# Patient Record
Sex: MALE | Race: WHITE | NOT HISPANIC OR LATINO | Employment: OTHER | URBAN - METROPOLITAN AREA
[De-identification: names, ages, dates, MRNs, and addresses within clinical notes are randomized per-mention and may not be internally consistent; named-entity substitution may affect disease eponyms.]

---

## 2017-01-12 ENCOUNTER — TELEPHONE (OUTPATIENT)
Dept: GASTROENTEROLOGY | Facility: CLINIC | Age: 77
End: 2017-01-12

## 2017-01-12 NOTE — TELEPHONE ENCOUNTER
Patient called. Advised of Dr. Swenson's note, patient states he feels ok and would like to see Dr. Swenson towards the end of February or first of March.  Appt scheduled for 03/02/17@1689 with Dr. Swenson.  Mirta coon.

## 2017-01-12 NOTE — TELEPHONE ENCOUNTER
----- Message from Fei Swenson MD sent at 1/12/2017 10:53 AM EST -----  Regarding: RE: DARIUS  Please call patient he can come see me as early as next week overbook or if hes feeling well, OV 3-6 mos    thanks    bd        ----- Message -----     From: Fei Wilkinson MD     Sent: 1/9/2017   4:56 PM       To: Fei Swenson MD  Subject: DARIUS MCCOY - this gentleman was put on my clinic schedule today by Rizwana, I believe by accident.  I spoke with him briefly - he is currently asymptomatic.  I told him I would let you know he was doing well currently and that you would decide if he needed to see Dr. Edwards to discuss repeat ERCP in the future.  Thanks.

## 2017-03-02 ENCOUNTER — OFFICE VISIT (OUTPATIENT)
Dept: GASTROENTEROLOGY | Facility: CLINIC | Age: 77
End: 2017-03-02

## 2017-03-02 VITALS
BODY MASS INDEX: 33.26 KG/M2 | SYSTOLIC BLOOD PRESSURE: 124 MMHG | WEIGHT: 245.6 LBS | HEIGHT: 72 IN | DIASTOLIC BLOOD PRESSURE: 72 MMHG

## 2017-03-02 DIAGNOSIS — R10.13 EPIGASTRIC PAIN: ICD-10-CM

## 2017-03-02 DIAGNOSIS — R14.0 ABDOMINAL BLOATING: Primary | ICD-10-CM

## 2017-03-02 PROCEDURE — 99213 OFFICE O/P EST LOW 20 MIN: CPT | Performed by: INTERNAL MEDICINE

## 2017-03-02 RX ORDER — AZITHROMYCIN 500 MG/1
500 TABLET, FILM COATED ORAL DAILY
Qty: 6 TABLET | Refills: 1 | Status: ON HOLD | OUTPATIENT
Start: 2017-03-02 | End: 2019-10-29

## 2017-03-02 NOTE — PROGRESS NOTES
Chief Complaint   Patient presents with   • Follow-up     from tests        Memo Bishop is a  77 y.o. male here for a follow up visit for epigastric pain, which has now resolved    HPI Comments: 76-year-old male patient of Dr. Swenson's who was last evaluated in the office on October 6. At that time he presented with complaints of epigastric pain, indigestion and intermittent nausea of greater than 6 months duration. 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 Kamryn Blank. Patient reported complete relief of his symptoms until the recurrence now 7-8 months ago. His workup to date has consisted of labs with normal LFTs and an EGD performed in October with negative findings. He was placed on Levsin which has been of huge benefit. An ultrasound was done with CBD size 8mm    He has not had pain now in 3 months.  No nausea, vomiting.  No change in bowel habits.      Past Medical History   Diagnosis Date   • Abnormal LFTs    • Cancer 2007     prostate cancer   • GERD (gastroesophageal reflux disease)    • Hyperlipidemia    • Hypertension        Past Surgical History   Procedure Laterality Date   • Ercp  2010     @ [facility]   • Pancreas surgery     • Sphincterotomy     • Endoscopy N/A 10/14/2016     Procedure: ESOPHAGOGASTRODUODENOSCOPY with biopsy;  Surgeon: Fei Swenson MD;  Location: [facility] ENDOSCOPY;  Service:    • Colonoscopy  2014     @[facility] per pt       Scheduled Meds:    Continuous Infusions:  No current facility-administered medications for this visit.     PRN Meds:.    Allergies   Allergen Reactions   • Levaquin [Levofloxacin]        Social History     Social History   • Marital status:      Spouse name: N/A   • Number of children: N/A   • Years of education: N/A     Occupational History   • Not on file.     Social History Main Topics   • Smoking status: Former Smoker     Packs/day: 1.00      Types: Cigarettes     Quit date: 1972   • Smokeless tobacco: Never Used   • Alcohol use Yes      Comment: social   • Drug use: No   • Sexual activity: Not on file     Other Topics Concern   • Not on file     Social History Narrative       Family History   Problem Relation Age of Onset   • Esophageal cancer Brother    • GIULIA disease Brother    • Prostate cancer Father    • GIULIA disease Father    • GIULIA disease Brother    • GIULIA disease Brother    • GIULIA disease Brother        Review of Systems   Constitutional: Negative.    All other systems reviewed and are negative.      Vitals:    03/02/17 1526   BP: 124/72       Physical Exam   Constitutional: He is oriented to person, place, and time. He appears well-developed and well-nourished.   HENT:   Head: Normocephalic and atraumatic.   Eyes: EOM are normal. Pupils are equal, round, and reactive to light.   Cardiovascular: Normal rate, regular rhythm and normal heart sounds.    Pulmonary/Chest: Effort normal.   Abdominal: Soft. Bowel sounds are normal. He exhibits no distension and no mass. There is no tenderness. No hernia.   Musculoskeletal: Normal range of motion.   Neurological: He is alert and oriented to person, place, and time.   Skin: Skin is dry.   Psychiatric: He has a normal mood and affect. His behavior is normal. Judgment and thought content normal.       No images are attached to the encounter.  Problem list    Epigastric pain, intermittent, resolved  Suspected sphincter of OD dysfunction, intermittent, resolved  Recent EGD normal  Recent ultrasound normal  Routine labs normal      Assessment/Plan    Continue Levsin as needed for pain of the upper abdomen    PPI daily for heartburn    Description for Zithromax given as he plans to travel to [place] and occasionally gets traveler's diarrhea.  He tells me Zithromax works where Cipro does not

## 2018-05-10 ENCOUNTER — PREP FOR SURGERY (OUTPATIENT)
Dept: OTHER | Facility: HOSPITAL | Age: 78
End: 2018-05-10

## 2018-05-10 DIAGNOSIS — Z12.11 ENCOUNTER FOR COLORECTAL CANCER SCREENING: Primary | ICD-10-CM

## 2018-05-10 DIAGNOSIS — Z12.12 ENCOUNTER FOR COLORECTAL CANCER SCREENING: Primary | ICD-10-CM

## 2018-05-22 PROBLEM — Z12.11 ENCOUNTER FOR COLORECTAL CANCER SCREENING: Status: ACTIVE | Noted: 2018-05-22

## 2018-05-22 PROBLEM — Z12.12 ENCOUNTER FOR COLORECTAL CANCER SCREENING: Status: ACTIVE | Noted: 2018-05-22

## 2018-06-07 ENCOUNTER — ANESTHESIA EVENT (OUTPATIENT)
Dept: GASTROENTEROLOGY | Facility: HOSPITAL | Age: 78
End: 2018-06-07

## 2018-06-07 ENCOUNTER — ANESTHESIA (OUTPATIENT)
Dept: GASTROENTEROLOGY | Facility: HOSPITAL | Age: 78
End: 2018-06-07

## 2018-06-07 ENCOUNTER — HOSPITAL ENCOUNTER (OUTPATIENT)
Facility: HOSPITAL | Age: 78
Setting detail: HOSPITAL OUTPATIENT SURGERY
Discharge: HOME | End: 2018-06-07
Attending: INTERNAL MEDICINE | Admitting: INTERNAL MEDICINE

## 2018-06-07 VITALS
TEMPERATURE: 97.9 F | HEIGHT: 70 IN | SYSTOLIC BLOOD PRESSURE: 119 MMHG | WEIGHT: 240 LBS | DIASTOLIC BLOOD PRESSURE: 64 MMHG | OXYGEN SATURATION: 98 % | BODY MASS INDEX: 34.36 KG/M2 | RESPIRATION RATE: 18 BRPM | HEART RATE: 57 BPM

## 2018-06-07 DIAGNOSIS — Z12.12 ENCOUNTER FOR COLORECTAL CANCER SCREENING: Primary | ICD-10-CM

## 2018-06-07 DIAGNOSIS — D50.8 OTHER IRON DEFICIENCY ANEMIA: ICD-10-CM

## 2018-06-07 DIAGNOSIS — Z12.11 ENCOUNTER FOR COLORECTAL CANCER SCREENING: Primary | ICD-10-CM

## 2018-06-07 DIAGNOSIS — E64.0 SEQUELAE OF PROTEIN-CALORIE MALNUTRITION (HCC): ICD-10-CM

## 2018-06-07 LAB
BASOPHILS # BLD AUTO: 0.01 10*3/MM3 (ref 0–0.2)
BASOPHILS NFR BLD AUTO: 0.1 % (ref 0–1.5)
DEPRECATED RDW RBC AUTO: 46.2 FL (ref 37–54)
EOSINOPHIL # BLD AUTO: 0.22 10*3/MM3 (ref 0–0.7)
EOSINOPHIL NFR BLD AUTO: 2.2 % (ref 0.3–6.2)
ERYTHROCYTE [DISTWIDTH] IN BLOOD BY AUTOMATED COUNT: 13.4 % (ref 11.5–14.5)
FERRITIN SERPL-MCNC: 247.8 NG/ML (ref 30–400)
FOLATE SERPL-MCNC: 10.09 NG/ML (ref 4.78–24.2)
HCT VFR BLD AUTO: 37.2 % (ref 40.4–52.2)
HGB BLD-MCNC: 11.6 G/DL (ref 13.7–17.6)
IMM GRANULOCYTES # BLD: 0.03 10*3/MM3 (ref 0–0.03)
IMM GRANULOCYTES NFR BLD: 0.3 % (ref 0–0.5)
IRON 24H UR-MRATE: 99 MCG/DL (ref 59–158)
IRON SATN MFR SERPL: 38 % (ref 20–50)
LYMPHOCYTES # BLD AUTO: 1.93 10*3/MM3 (ref 0.9–4.8)
LYMPHOCYTES NFR BLD AUTO: 19.3 % (ref 19.6–45.3)
MCH RBC QN AUTO: 29.3 PG (ref 27–32.7)
MCHC RBC AUTO-ENTMCNC: 31.2 G/DL (ref 32.6–36.4)
MCV RBC AUTO: 93.9 FL (ref 79.8–96.2)
MONOCYTES # BLD AUTO: 1.11 10*3/MM3 (ref 0.2–1.2)
MONOCYTES NFR BLD AUTO: 11.1 % (ref 5–12)
NEUTROPHILS # BLD AUTO: 6.71 10*3/MM3 (ref 1.9–8.1)
NEUTROPHILS NFR BLD AUTO: 67 % (ref 42.7–76)
PLATELET # BLD AUTO: 178 10*3/MM3 (ref 140–500)
PMV BLD AUTO: 10.2 FL (ref 6–12)
RBC # BLD AUTO: 3.96 10*6/MM3 (ref 4.6–6)
TIBC SERPL-MCNC: 258 MCG/DL (ref 298–536)
TRANSFERRIN SERPL-MCNC: 173 MG/DL (ref 200–360)
VIT B12 BLD-MCNC: 496 PG/ML (ref 211–946)
WBC NRBC COR # BLD: 10.01 10*3/MM3 (ref 4.5–10.7)

## 2018-06-07 PROCEDURE — 82728 ASSAY OF FERRITIN: CPT | Performed by: INTERNAL MEDICINE

## 2018-06-07 PROCEDURE — 85025 COMPLETE CBC W/AUTO DIFF WBC: CPT | Performed by: INTERNAL MEDICINE

## 2018-06-07 PROCEDURE — 84466 ASSAY OF TRANSFERRIN: CPT | Performed by: INTERNAL MEDICINE

## 2018-06-07 PROCEDURE — S0260 H&P FOR SURGERY: HCPCS | Performed by: INTERNAL MEDICINE

## 2018-06-07 PROCEDURE — G0121 COLON CA SCRN NOT HI RSK IND: HCPCS | Performed by: INTERNAL MEDICINE

## 2018-06-07 PROCEDURE — 82746 ASSAY OF FOLIC ACID SERUM: CPT | Performed by: INTERNAL MEDICINE

## 2018-06-07 PROCEDURE — 25010000002 PROPOFOL 10 MG/ML EMULSION: Performed by: ANESTHESIOLOGY

## 2018-06-07 PROCEDURE — 82607 VITAMIN B-12: CPT | Performed by: INTERNAL MEDICINE

## 2018-06-07 PROCEDURE — 83540 ASSAY OF IRON: CPT | Performed by: INTERNAL MEDICINE

## 2018-06-07 PROCEDURE — 36415 COLL VENOUS BLD VENIPUNCTURE: CPT | Performed by: INTERNAL MEDICINE

## 2018-06-07 RX ORDER — SODIUM CHLORIDE, SODIUM LACTATE, POTASSIUM CHLORIDE, CALCIUM CHLORIDE 600; 310; 30; 20 MG/100ML; MG/100ML; MG/100ML; MG/100ML
1000 INJECTION, SOLUTION INTRAVENOUS CONTINUOUS
Status: DISCONTINUED | OUTPATIENT
Start: 2018-06-07 | End: 2018-06-07 | Stop reason: HOSPADM

## 2018-06-07 RX ORDER — SODIUM CHLORIDE 0.9 % (FLUSH) 0.9 %
3 SYRINGE (ML) INJECTION AS NEEDED
Status: DISCONTINUED | OUTPATIENT
Start: 2018-06-07 | End: 2018-06-07 | Stop reason: HOSPADM

## 2018-06-07 RX ORDER — LIDOCAINE HYDROCHLORIDE 10 MG/ML
0.5 INJECTION, SOLUTION INFILTRATION; PERINEURAL ONCE AS NEEDED
Status: DISCONTINUED | OUTPATIENT
Start: 2018-06-07 | End: 2018-06-07 | Stop reason: HOSPADM

## 2018-06-07 RX ORDER — LIDOCAINE HYDROCHLORIDE 20 MG/ML
INJECTION, SOLUTION INFILTRATION; PERINEURAL AS NEEDED
Status: DISCONTINUED | OUTPATIENT
Start: 2018-06-07 | End: 2018-06-07 | Stop reason: SURG

## 2018-06-07 RX ORDER — PROPOFOL 10 MG/ML
VIAL (ML) INTRAVENOUS CONTINUOUS PRN
Status: DISCONTINUED | OUTPATIENT
Start: 2018-06-07 | End: 2018-06-07 | Stop reason: SURG

## 2018-06-07 RX ORDER — PROPOFOL 10 MG/ML
VIAL (ML) INTRAVENOUS AS NEEDED
Status: DISCONTINUED | OUTPATIENT
Start: 2018-06-07 | End: 2018-06-07 | Stop reason: SURG

## 2018-06-07 RX ADMIN — PROPOFOL 100 MG: 10 INJECTION, EMULSION INTRAVENOUS at 11:20

## 2018-06-07 RX ADMIN — SODIUM CHLORIDE, POTASSIUM CHLORIDE, SODIUM LACTATE AND CALCIUM CHLORIDE 1000 ML: 600; 310; 30; 20 INJECTION, SOLUTION INTRAVENOUS at 10:27

## 2018-06-07 RX ADMIN — PROPOFOL 100 MCG/KG/MIN: 10 INJECTION, EMULSION INTRAVENOUS at 11:20

## 2018-06-07 RX ADMIN — LIDOCAINE HYDROCHLORIDE 60 MG: 20 INJECTION, SOLUTION INFILTRATION; PERINEURAL at 11:20

## 2018-06-07 NOTE — ANESTHESIA POSTPROCEDURE EVALUATION
"Patient: Memo Bishop    Procedure Summary     Date:  06/07/18 Room / Location:   [facility] /  [facility]    Anesthesia Start:  1121 Anesthesia Stop:  1136    Procedure:  COLONOSCOPY TO TRANSVERSE COLON (N/A ) Diagnosis:       Encounter for colorectal cancer screening      (Encounter for colorectal cancer screening [Z12.11, Z12.12])    Surgeon:  Fei Swenson MD Provider:  Anatoliy Argueta MD    Anesthesia Type:  MAC ASA Status:  3          Anesthesia Type: MAC  Last vitals  BP   117/67 (06/07/18 1148)   Temp   36.6 °C (97.9 °F) (06/07/18 1018)   Pulse   (!) 47 (06/07/18 1148)   Resp   16 (06/07/18 1148)     SpO2   97 % (06/07/18 1148)     Post Anesthesia Care and Evaluation    Patient location during evaluation: PACU  Patient participation: complete - patient participated  Level of consciousness: awake  Pain score: 0  Pain management: adequate  Airway patency: patent  Anesthetic complications: No anesthetic complications  PONV Status: none  Cardiovascular status: acceptable  Respiratory status: acceptable  Hydration status: acceptable    Comments: /67   Pulse (!) 47   Temp 36.6 °C (97.9 °F) (Oral)   Resp 16   Ht 177.8 cm (70\")   Wt 109 kg (240 lb)   SpO2 97%   BMI 34.44 kg/m²       "

## 2018-06-07 NOTE — ANESTHESIA PREPROCEDURE EVALUATION
Anesthesia Evaluation     Patient summary reviewed and Nursing notes reviewed                Airway   Mallampati: I  TM distance: <3 FB  Neck ROM: full  no difficulty expected  Dental - normal exam     Pulmonary - negative pulmonary ROS and normal exam   Cardiovascular - normal exam    (+) hypertension, hyperlipidemia,       Neuro/Psych- negative ROS  GI/Hepatic/Renal/Endo    (+)  GERD,      Musculoskeletal (-) negative ROS    Abdominal  - normal exam    Bowel sounds: normal.   Substance History - negative use     OB/GYN negative ob/gyn ROS         Other      history of cancer                    Anesthesia Plan    ASA 3     MAC     Anesthetic plan and risks discussed with patient.

## 2018-06-07 NOTE — H&P
[facility]  Pre Procedure History & Physical    Chief Complaint:   Time for my colonoscopy    Subjective     HPI:   78 y.o. male     Past Medical History:   Past Medical History:   Diagnosis Date   • Abnormal LFTs    • Cancer 2007    prostate cancer   • GERD (gastroesophageal reflux disease)    • Hyperlipidemia    • Hypertension        Family History:  Family History   Problem Relation Age of Onset   • Esophageal cancer Brother    • GIULIA disease Brother    • Prostate cancer Father    • GIULIA disease Father    • GIULIA disease Brother    • GIULIA disease Brother    • GIULIA disease Brother        Social History:   reports that he quit smoking about 46 years ago. His smoking use included Cigarettes. He smoked 1.00 pack per day. He has never used smokeless tobacco. He reports that he drinks alcohol. He reports that he does not use drugs.    Medications:   Prescriptions Prior to Admission   Medication Sig Dispense Refill Last Dose   • atenolol (TENORMIN) 100 MG tablet    6/7/2018 at Unknown time   • captopril (CAPOTEN) 12.5 MG tablet    6/7/2018 at Unknown time   • desoximetasone (TOPICORT) 0.25 % cream    6/6/2018 at Unknown time   • diphenhydrAMINE-acetaminophen (TYLENOL PM)  MG tablet per tablet Take 1 tablet by mouth At Night As Needed for sleep.   6/6/2018 at Unknown time   • gabapentin (NEURONTIN) 400 MG capsule    Past Week at Unknown time   • omeprazole (PriLOSEC) 40 MG capsule    6/7/2018 at Unknown time   • oxybutynin (DITROPAN) 5 MG tablet    6/7/2018 at Unknown time   • simvastatin (ZOCOR) 20 MG tablet    6/6/2018 at Unknown time   • aspirin 325 MG tablet Take 325 mg by mouth Daily.   5/31/2018   • azithromycin (ZITHROMAX) 500 MG tablet Take 1 tablet by mouth Daily. 6 tablet 1 More than a month at Unknown time   • hyoscyamine (ANASPAZ,LEVSIN) 0.125 MG tablet Take 1-2 tablets by mouth every 6 hours as needed. 60 tablet 2 More than a month at Unknown time       Allergies:  Levaquin  "[levofloxacin]    ROS:    Pertinent items are noted in HPI     Objective     Blood pressure 145/75, pulse 51, temperature 97.9 °F (36.6 °C), temperature source Oral, resp. rate 16, height 177.8 cm (70\"), weight 109 kg (240 lb), SpO2 98 %.    Physical Exam   Constitutional: Pt is oriented to person, place, and time and well-developed, well-nourished, and in no distress.   HENT:   Mouth/Throat: Oropharynx is clear and moist.   Neck: Normal range of motion. Neck supple.   Cardiovascular: Normal rate, regular rhythm and normal heart sounds.    Pulmonary/Chest: Effort normal and breath sounds normal. No respiratory distress. No  wheezes.   Abdominal: Soft. Bowel sounds are normal.   Skin: Skin is warm and dry.   Psychiatric: Mood, memory, affect and judgment normal.     Assessment/Plan     Diagnosis:  Encounter for screening for colon cancer    Anticipated Surgical Procedure:  Colonoscopy    The risks, benefits, and alternatives of this procedure have been discussed with the patient or the responsible party- the patient understands and agrees to proceed.                                                                "

## 2018-07-10 ENCOUNTER — PREP FOR SURGERY (OUTPATIENT)
Dept: OTHER | Facility: HOSPITAL | Age: 78
End: 2018-07-10

## 2018-07-10 ENCOUNTER — TELEPHONE (OUTPATIENT)
Dept: GASTROENTEROLOGY | Facility: CLINIC | Age: 78
End: 2018-07-10

## 2018-07-10 DIAGNOSIS — K21.00 GERD WITH ESOPHAGITIS: Primary | ICD-10-CM

## 2018-07-10 DIAGNOSIS — Z12.11 ENCOUNTER FOR SCREENING FOR MALIGNANT NEOPLASM OF COLON: Primary | ICD-10-CM

## 2018-07-10 NOTE — TELEPHONE ENCOUNTER
Patient called, he states needed to repeat his colonoscopy because of his poor prep and he also mentioned he needs an EGD. Advised an update will be sent to Dr. Swenson for clarification of the EGD order. He verb understanding and is agreeable to the plan.

## 2018-07-10 NOTE — TELEPHONE ENCOUNTER
----- Message from Kirstie Gore sent at 7/10/2018  8:49 AM EDT -----  Regarding: PT NEEDS ANOTHER ORDER   Contact: [phone]  PT IS CALLING STATING  WANTED PT TO HAVE A REPEAT OF A C/S & EGD BUT NEEDS A NEW ORDER PUT IN

## 2018-07-10 NOTE — TELEPHONE ENCOUNTER
"Per Dr Swenson: \" case request is in  for EGD and colonoscopy     Please do split dose commercial prep add one bottle of magnesium citrate the night before and one bottle magnesium citrate the morning of\"   "

## 2018-08-01 PROBLEM — K21.00 GERD WITH ESOPHAGITIS: Status: ACTIVE | Noted: 2018-08-01

## 2018-08-27 ENCOUNTER — ANESTHESIA EVENT (OUTPATIENT)
Dept: GASTROENTEROLOGY | Facility: HOSPITAL | Age: 78
End: 2018-08-27

## 2018-08-27 ENCOUNTER — ANESTHESIA (OUTPATIENT)
Dept: GASTROENTEROLOGY | Facility: HOSPITAL | Age: 78
End: 2018-08-27

## 2018-08-27 ENCOUNTER — HOSPITAL ENCOUNTER (OUTPATIENT)
Facility: HOSPITAL | Age: 78
Setting detail: HOSPITAL OUTPATIENT SURGERY
Discharge: HOME | End: 2018-08-27
Attending: INTERNAL MEDICINE | Admitting: INTERNAL MEDICINE

## 2018-08-27 VITALS
OXYGEN SATURATION: 97 % | RESPIRATION RATE: 16 BRPM | SYSTOLIC BLOOD PRESSURE: 155 MMHG | DIASTOLIC BLOOD PRESSURE: 66 MMHG | HEIGHT: 70 IN | BODY MASS INDEX: 34.4 KG/M2 | TEMPERATURE: 97.9 F | WEIGHT: 240.3 LBS | HEART RATE: 44 BPM

## 2018-08-27 DIAGNOSIS — K21.00 GERD WITH ESOPHAGITIS: ICD-10-CM

## 2018-08-27 PROCEDURE — 43239 EGD BIOPSY SINGLE/MULTIPLE: CPT | Performed by: INTERNAL MEDICINE

## 2018-08-27 PROCEDURE — 45378 DIAGNOSTIC COLONOSCOPY: CPT | Performed by: INTERNAL MEDICINE

## 2018-08-27 PROCEDURE — 25010000002 PROPOFOL 10 MG/ML EMULSION: Performed by: ANESTHESIOLOGY

## 2018-08-27 PROCEDURE — S0260 H&P FOR SURGERY: HCPCS | Performed by: INTERNAL MEDICINE

## 2018-08-27 PROCEDURE — 88305 TISSUE EXAM BY PATHOLOGIST: CPT | Performed by: INTERNAL MEDICINE

## 2018-08-27 PROCEDURE — 88312 SPECIAL STAINS GROUP 1: CPT | Performed by: INTERNAL MEDICINE

## 2018-08-27 RX ORDER — ATORVASTATIN CALCIUM 40 MG/1
40 TABLET, FILM COATED ORAL DAILY
COMMUNITY

## 2018-08-27 RX ORDER — PROPOFOL 10 MG/ML
VIAL (ML) INTRAVENOUS CONTINUOUS PRN
Status: DISCONTINUED | OUTPATIENT
Start: 2018-08-27 | End: 2018-08-27 | Stop reason: SURG

## 2018-08-27 RX ORDER — SODIUM CHLORIDE, SODIUM LACTATE, POTASSIUM CHLORIDE, CALCIUM CHLORIDE 600; 310; 30; 20 MG/100ML; MG/100ML; MG/100ML; MG/100ML
1000 INJECTION, SOLUTION INTRAVENOUS CONTINUOUS
Status: DISCONTINUED | OUTPATIENT
Start: 2018-08-27 | End: 2018-08-27 | Stop reason: HOSPADM

## 2018-08-27 RX ORDER — PROPOFOL 10 MG/ML
VIAL (ML) INTRAVENOUS AS NEEDED
Status: DISCONTINUED | OUTPATIENT
Start: 2018-08-27 | End: 2018-08-27 | Stop reason: SURG

## 2018-08-27 RX ORDER — RANOLAZINE 500 MG/1
500 TABLET, EXTENDED RELEASE ORAL 2 TIMES DAILY
COMMUNITY
End: 2018-10-15 | Stop reason: SINTOL

## 2018-08-27 RX ORDER — LISINOPRIL 10 MG/1
10 TABLET ORAL DAILY
COMMUNITY
End: 2018-10-15 | Stop reason: DRUGHIGH

## 2018-08-27 RX ORDER — LIDOCAINE HYDROCHLORIDE 20 MG/ML
INJECTION, SOLUTION INFILTRATION; PERINEURAL AS NEEDED
Status: DISCONTINUED | OUTPATIENT
Start: 2018-08-27 | End: 2018-08-27 | Stop reason: SURG

## 2018-08-27 RX ADMIN — PROPOFOL 120 MG: 10 INJECTION, EMULSION INTRAVENOUS at 11:24

## 2018-08-27 RX ADMIN — PROPOFOL 140 MCG/KG/MIN: 10 INJECTION, EMULSION INTRAVENOUS at 11:26

## 2018-08-27 RX ADMIN — SODIUM CHLORIDE, POTASSIUM CHLORIDE, SODIUM LACTATE AND CALCIUM CHLORIDE: 600; 310; 30; 20 INJECTION, SOLUTION INTRAVENOUS at 11:24

## 2018-08-27 RX ADMIN — LIDOCAINE HYDROCHLORIDE 60 MG: 20 INJECTION, SOLUTION INFILTRATION; PERINEURAL at 11:24

## 2018-08-27 RX ADMIN — SODIUM CHLORIDE, POTASSIUM CHLORIDE, SODIUM LACTATE AND CALCIUM CHLORIDE 1000 ML: 600; 310; 30; 20 INJECTION, SOLUTION INTRAVENOUS at 10:42

## 2018-08-27 NOTE — ANESTHESIA PREPROCEDURE EVALUATION
Anesthesia Evaluation     Patient summary reviewed and Nursing notes reviewed                Airway   Mallampati: II  TM distance: <3 FB  Neck ROM: full  no difficulty expected  Dental - normal exam   (+) poor dentition and implants    Pulmonary - negative pulmonary ROS and normal exam   Cardiovascular - normal exam    (+) hypertension, hyperlipidemia,       Neuro/Psych- negative ROS  GI/Hepatic/Renal/Endo    (+)  GERD,      Musculoskeletal (-) negative ROS    Abdominal  - normal exam    Bowel sounds: normal.   Substance History - negative use     OB/GYN negative ob/gyn ROS         Other      history of cancer                    Anesthesia Plan    ASA 3     MAC     Anesthetic plan and risks discussed with patient.

## 2018-08-27 NOTE — ANESTHESIA POSTPROCEDURE EVALUATION
"Patient: Memo Bishop    Procedure Summary     Date:  08/27/18 Room / Location:  [facility] ENDOSCOPY 8 /  [facility]    Anesthesia Start:  1123 Anesthesia Stop:  1150    Procedures:       ESOPHAGOGASTRODUODENOSCOPY with biopsy (N/A Esophagus)      COLONOSCOPY into cecum (N/A ) Diagnosis:       GERD with esophagitis      (GERD with esophagitis [K21.0])    Surgeon:  Fei Swenson MD Provider:  Héctor Valdez MD    Anesthesia Type:  MAC ASA Status:  3          Anesthesia Type: MAC  Last vitals  BP   155/66 (08/27/18 1210)   Temp   36.6 °C (97.9 °F) (08/27/18 1029)   Pulse   (!) 44 (08/27/18 1210)   Resp   16 (08/27/18 1210)     SpO2   97 % (08/27/18 1210)     Post Anesthesia Care and Evaluation    Patient location during evaluation: bedside  Patient participation: complete - patient participated  Level of consciousness: awake and alert  Pain management: adequate  Anesthetic complications: No anesthetic complications    Cardiovascular status: acceptable  Respiratory status: acceptable  Hydration status: acceptable    Comments: /66   Pulse (!) 44   Temp 36.6 °C (97.9 °F) (Oral)   Resp 16   Ht 177.8 cm (70\")   Wt 109 kg (240 lb 4.8 oz)   SpO2 97%   BMI 34.48 kg/m²         "

## 2018-08-28 LAB
CYTO UR: NORMAL
LAB AP CASE REPORT: NORMAL
PATH REPORT.FINAL DX SPEC: NORMAL
PATH REPORT.GROSS SPEC: NORMAL

## 2018-09-10 ENCOUNTER — TELEPHONE (OUTPATIENT)
Dept: GASTROENTEROLOGY | Facility: CLINIC | Age: 78
End: 2018-09-10

## 2018-09-10 NOTE — TELEPHONE ENCOUNTER
----- Message from Fei Swenson MD sent at 8/28/2018  2:16 PM EDT -----  Pathology benign, send Hemoccults to his home, office visit nurse practitioner next available

## 2018-09-10 NOTE — TELEPHONE ENCOUNTER
Returned patient's phone call, he states he has not received his hemoccult cards yet. Advised as per Dr. Swenson's note regarding his path results. Advised will place hemocult cards at the 's desk for  at his convenience.

## 2018-09-10 NOTE — TELEPHONE ENCOUNTER
----- Message from Kirstie Gore sent at 9/10/2018 12:13 PM EDT -----  Regarding: pt called   Contact: [phone]  Pt called, has not received his Hemoccults yet ??? Pt is asking for a call back

## 2018-09-18 ENCOUNTER — TELEPHONE (OUTPATIENT)
Dept: GASTROENTEROLOGY | Facility: CLINIC | Age: 78
End: 2018-09-18

## 2018-09-18 DIAGNOSIS — K21.9 GASTROESOPHAGEAL REFLUX DISEASE, ESOPHAGITIS PRESENCE NOT SPECIFIED: Primary | ICD-10-CM

## 2018-09-18 NOTE — TELEPHONE ENCOUNTER
Hemoccults negative, no indication for capsule endoscopy, office visit nurse practitioner next available, CBC 1 month (Routing comment)

## 2018-09-18 NOTE — TELEPHONE ENCOUNTER
Patient called, advised as per Dr. Swenson's note. He verb understanding and is agreeable to the plan. Patient's f/u visit is scheduled for 10/15 with Isidra and his lab work appt is scheduled for 10/11.

## 2018-10-07 ENCOUNTER — RESULTS ENCOUNTER (OUTPATIENT)
Dept: GASTROENTEROLOGY | Facility: CLINIC | Age: 78
End: 2018-10-07

## 2018-10-07 DIAGNOSIS — K21.9 GASTROESOPHAGEAL REFLUX DISEASE, ESOPHAGITIS PRESENCE NOT SPECIFIED: ICD-10-CM

## 2018-10-11 LAB
ERYTHROCYTE [DISTWIDTH] IN BLOOD BY AUTOMATED COUNT: 13 % (ref 11.5–14.5)
HCT VFR BLD AUTO: 39.9 % (ref 40.4–52.2)
HGB BLD-MCNC: 12.2 G/DL (ref 13.7–17.6)
MCH RBC QN AUTO: 28.5 PG (ref 27–32.7)
MCHC RBC AUTO-ENTMCNC: 30.6 G/DL (ref 32.6–36.4)
MCV RBC AUTO: 93.2 FL (ref 79.8–96.2)
PLATELET # BLD AUTO: 226 10*3/MM3 (ref 140–500)
RBC # BLD AUTO: 4.28 10*6/MM3 (ref 4.6–6)
WBC # BLD AUTO: 5.86 10*3/MM3 (ref 4.5–10.7)

## 2018-10-15 ENCOUNTER — OFFICE VISIT (OUTPATIENT)
Dept: GASTROENTEROLOGY | Facility: CLINIC | Age: 78
End: 2018-10-15

## 2018-10-15 VITALS
DIASTOLIC BLOOD PRESSURE: 76 MMHG | HEIGHT: 70 IN | BODY MASS INDEX: 34.13 KG/M2 | SYSTOLIC BLOOD PRESSURE: 112 MMHG | TEMPERATURE: 97.5 F | WEIGHT: 238.4 LBS

## 2018-10-15 DIAGNOSIS — K29.50 OTHER CHRONIC GASTRITIS WITHOUT HEMORRHAGE: ICD-10-CM

## 2018-10-15 DIAGNOSIS — D64.9 ANEMIA, UNSPECIFIED TYPE: Primary | ICD-10-CM

## 2018-10-15 DIAGNOSIS — K21.9 GASTROESOPHAGEAL REFLUX DISEASE WITHOUT ESOPHAGITIS: ICD-10-CM

## 2018-10-15 DIAGNOSIS — K57.90 DIVERTICULOSIS OF INTESTINE WITHOUT BLEEDING, UNSPECIFIED INTESTINAL TRACT LOCATION: ICD-10-CM

## 2018-10-15 PROCEDURE — 99214 OFFICE O/P EST MOD 30 MIN: CPT | Performed by: NURSE PRACTITIONER

## 2018-10-15 RX ORDER — ASCORBIC ACID 500 MG
500 TABLET ORAL 2 TIMES DAILY
COMMUNITY

## 2018-10-15 RX ORDER — CHOLECALCIFEROL (VITAMIN D3) 125 MCG
500 CAPSULE ORAL DAILY
COMMUNITY

## 2018-10-15 RX ORDER — TRAZODONE HYDROCHLORIDE 50 MG/1
25 TABLET ORAL DAILY
COMMUNITY
End: 2019-10-29

## 2018-10-15 RX ORDER — QUINIDINE GLUCONATE 324 MG
240 TABLET, EXTENDED RELEASE ORAL 2 TIMES DAILY WITH MEALS
COMMUNITY

## 2018-10-15 RX ORDER — CYCLOBENZAPRINE HCL 10 MG
10 TABLET ORAL DAILY PRN
COMMUNITY

## 2018-10-15 RX ORDER — LISINOPRIL 20 MG/1
40 TABLET ORAL DAILY
COMMUNITY
Start: 2018-08-27

## 2018-10-15 NOTE — PROGRESS NOTES
Chief Complaint   Patient presents with   • Follow-up     post scopes   • Anemia       Memo Bishop is a  78 y.o. male here for a follow up visit for anemia and GERD.    HPI  77 yo m presents today for follow up visit for Iron deficiency anemia and GERD. He is a patient of Dr. Swenson. He was last seen in the office on 3/2/2017. He had a lower Hgb of 11.6 on 6/2018. His normal Hgb ranges in the 12s. He underwent EGD and Colonoscopy to look for GI bleeding source. EGD showed chronic mild gastritis. Path was negative. Colonoscopy showed NBIH and diverticulosis. No GI bleeding source was found. Hemoccult stool cards x 3 were NEGATIVE. He does take omeprazole 40 mg daily and admits it controls his reflux well. He denies any dysphagia, reflux, abd pain, N&V, diarrhea, constipation, rectal bleeding or melena. He admits his appetite is ok and his weight is stable. He does report having some heart issues and was told by the cardiologist it was his thyroid so he has appt with endocrine next month. He is taking Iron supplementation. He denies previous hx anemia.     Past Medical History:   Diagnosis Date   • Abnormal LFTs    • Anemia    • Cancer (CMS/HCC) 2007    prostate cancer   • GERD (gastroesophageal reflux disease)    • Hyperlipidemia    • Hypertension        Past Surgical History:   Procedure Laterality Date   • CARDIAC CATHETERIZATION     • CATARACT EXTRACTION     • COLONOSCOPY  2014    @[facility] per pt   • COLONOSCOPY N/A 6/7/2018    incomplete exam/ poor prep   • COLONOSCOPY N/A 8/27/2018    NBIH, diverticulosis in sigmoid/descending colon   • ENDOSCOPY N/A 10/14/2016    normal exam/path   • ENDOSCOPY N/A 8/27/2018    normal exam/path   • ERCP  2010    @ [facility]   • HERNIA REPAIR Bilateral     inguinal and umbilical   • LAPAROSCOPIC CHOLECYSTECTOMY     • PANCREAS SURGERY     • SPHINCTEROTOMY     • TONSILLECTOMY     • VASECTOMY         Scheduled Meds:    Continuous Infusions:  No current  facility-administered medications for this visit.     PRN Meds:.    Allergies   Allergen Reactions   • Levaquin [Levofloxacin]        Social History     Social History   • Marital status:      Spouse name: N/A   • Number of children: N/A   • Years of education: N/A     Occupational History   • Not on file.     Social History Main Topics   • Smoking status: Former Smoker     Packs/day: 1.00     Types: Cigarettes     Quit date: 1972   • Smokeless tobacco: Never Used   • Alcohol use Yes      Comment: social   • Drug use: No   • Sexual activity: Defer     Other Topics Concern   • Not on file     Social History Narrative   • No narrative on file       Family History   Problem Relation Age of Onset   • Esophageal cancer Brother    • GIULIA disease Brother    • Prostate cancer Father    • GIULIA disease Father    • GIULIA disease Brother    • GIULIA disease Brother    • GIULIA disease Brother        Review of Systems   Constitutional: Negative for appetite change, chills, diaphoresis, fatigue, fever and unexpected weight change.   HENT: Negative for nosebleeds, postnasal drip, sore throat, trouble swallowing and voice change.    Respiratory: Negative for cough, choking, chest tightness, shortness of breath and wheezing.    Cardiovascular: Negative for chest pain.   Gastrointestinal: Negative for abdominal distention, abdominal pain, anal bleeding, blood in stool, constipation, diarrhea, nausea, rectal pain and vomiting.   Endocrine: Negative for polydipsia, polyphagia and polyuria.   Musculoskeletal: Negative for gait problem.   Skin: Negative for rash and wound.   Allergic/Immunologic: Negative for food allergies.   Neurological: Negative for dizziness, speech difficulty and light-headedness.   Psychiatric/Behavioral: Negative for confusion, self-injury, sleep disturbance and suicidal ideas.       Vitals:    10/15/18 0911   BP: 112/76   Temp: 97.5 °F (36.4 °C)       Physical Exam   Constitutional: He is oriented to person, place,  and time. He appears well-developed and well-nourished. He does not appear ill. No distress.   HENT:   Head: Normocephalic.   Eyes: Pupils are equal, round, and reactive to light.   Cardiovascular: Normal rate, regular rhythm and normal heart sounds.    Pulmonary/Chest: Effort normal and breath sounds normal.   Abdominal: Soft. Bowel sounds are normal. He exhibits no distension and no mass. There is no hepatosplenomegaly. There is no tenderness. There is no rebound and no guarding. No hernia.   Musculoskeletal: Normal range of motion.   Neurological: He is alert and oriented to person, place, and time.   Skin: Skin is warm and dry.   Psychiatric: He has a normal mood and affect. His speech is normal and behavior is normal. Judgment normal.       No images are attached to the encounter.    Assessment & Plan    1. Anemia, unspecified type    2. Gastroesophageal reflux disease without esophagitis    3. Other chronic gastritis without hemorrhage    4. Diverticulosis of intestine without bleeding, unspecified intestinal tract location      I reviewed scope results with the patient. Hgb has improved back to 12.2 from 11.6. He denies any signs of GI bleeding. GERD well controlled on omeprazole. Follow up with me in 2 months. Will recheck Hgb then. He is to keep his appt with endocrine as planned. Call office with any issues.

## 2018-11-05 ENCOUNTER — TELEPHONE (OUTPATIENT)
Dept: GASTROENTEROLOGY | Facility: CLINIC | Age: 78
End: 2018-11-05

## 2018-11-05 DIAGNOSIS — D64.9 ANEMIA, UNSPECIFIED TYPE: Primary | ICD-10-CM

## 2018-11-05 NOTE — TELEPHONE ENCOUNTER
----- Message from Fei Swenson MD sent at 10/16/2018  1:20 PM EDT -----  Hemoglobin better, repeat in 6 months (cbc)

## 2018-11-08 NOTE — TELEPHONE ENCOUNTER
Patient called advised as per Dr. Swenson his Hgb is better and to repeat his lab in 6 months. He verb understanding.

## 2018-12-10 ENCOUNTER — OFFICE VISIT (OUTPATIENT)
Dept: GASTROENTEROLOGY | Facility: CLINIC | Age: 78
End: 2018-12-10

## 2018-12-10 VITALS
DIASTOLIC BLOOD PRESSURE: 66 MMHG | WEIGHT: 226.2 LBS | TEMPERATURE: 97.4 F | SYSTOLIC BLOOD PRESSURE: 102 MMHG | HEIGHT: 70 IN | BODY MASS INDEX: 32.38 KG/M2

## 2018-12-10 DIAGNOSIS — K57.90 DIVERTICULOSIS OF INTESTINE WITHOUT BLEEDING, UNSPECIFIED INTESTINAL TRACT LOCATION: ICD-10-CM

## 2018-12-10 DIAGNOSIS — D50.8 OTHER IRON DEFICIENCY ANEMIA: ICD-10-CM

## 2018-12-10 DIAGNOSIS — K29.50 OTHER CHRONIC GASTRITIS WITHOUT HEMORRHAGE: ICD-10-CM

## 2018-12-10 DIAGNOSIS — K21.9 GASTROESOPHAGEAL REFLUX DISEASE, ESOPHAGITIS PRESENCE NOT SPECIFIED: Primary | ICD-10-CM

## 2018-12-10 PROCEDURE — 99214 OFFICE O/P EST MOD 30 MIN: CPT | Performed by: NURSE PRACTITIONER

## 2018-12-10 NOTE — PROGRESS NOTES
Chief Complaint   Patient presents with   • Follow-up   • Anemia       Memo Bishop is a  78 y.o. male here for a follow up visit for GERD and anemia.     HPI  77 yo m presents today for follow up visit for anemia and GERD. He is a patient of Dr. Swenson. He was last seen in the office by me on 10/15/18. He has hx GERD with chronic gastritis and admits he does well on the omeprazole daily. He denies any dysphagia, reflux, abd pain, N&V, diarrhea, constipation, rectal bleeding or melena. He admits his appetite is good and his weight is stable. He also has hx NAVEED and is taking iron daily. His last Hgb was good at 12.2 on 10/11/18. He denies any signs of GI bleeding.     Past Medical History:   Diagnosis Date   • Abnormal LFTs    • Anemia    • Cancer (CMS/HCC) 2007    prostate cancer   • GERD (gastroesophageal reflux disease)    • Hyperlipidemia    • Hypertension        Past Surgical History:   Procedure Laterality Date   • CARDIAC CATHETERIZATION     • CATARACT EXTRACTION     • COLONOSCOPY  2014    @[facility] per pt   • ERCP  2010    @ [facility]   • HERNIA REPAIR Bilateral     inguinal and umbilical   • LAPAROSCOPIC CHOLECYSTECTOMY     • PANCREAS SURGERY     • SPHINCTEROTOMY     • TONSILLECTOMY     • VASECTOMY         Scheduled Meds:    Continuous Infusions:  No current facility-administered medications for this visit.     PRN Meds:.    Allergies   Allergen Reactions   • Levaquin [Levofloxacin]        Social History     Socioeconomic History   • Marital status:      Spouse name: Not on file   • Number of children: Not on file   • Years of education: Not on file   • Highest education level: Not on file   Social Needs   • Financial resource strain: Not on file   • Food insecurity - worry: Not on file   • Food insecurity - inability: Not on file   • Transportation needs - medical: Not on file   • Transportation needs - non-medical: Not on file   Occupational History   • Not on file   Tobacco Use   •  Smoking status: Former Smoker     Packs/day: 1.00     Types: Cigarettes     Last attempt to quit: 1972     Years since quittin.9   • Smokeless tobacco: Never Used   Substance and Sexual Activity   • Alcohol use: Yes     Comment: social   • Drug use: No   • Sexual activity: Defer   Other Topics Concern   • Not on file   Social History Narrative   • Not on file       Family History   Problem Relation Age of Onset   • Esophageal cancer Brother    • GIULIA disease Brother    • Prostate cancer Father    • GIULIA disease Father    • GIULIA disease Brother    • GIULIA disease Brother    • GIULIA disease Brother        Review of Systems   Constitutional: Negative for appetite change, chills, diaphoresis, fatigue, fever and unexpected weight change.   HENT: Negative for nosebleeds, postnasal drip, sore throat, trouble swallowing and voice change.    Respiratory: Negative for cough, choking, chest tightness, shortness of breath and wheezing.    Cardiovascular: Negative for chest pain.   Gastrointestinal: Negative for abdominal distention, abdominal pain, anal bleeding, blood in stool, constipation, diarrhea, nausea, rectal pain and vomiting.   Endocrine: Negative for polydipsia, polyphagia and polyuria.   Musculoskeletal: Negative for gait problem.   Skin: Negative for rash and wound.   Allergic/Immunologic: Negative for food allergies.   Neurological: Negative for dizziness, speech difficulty and light-headedness.   Psychiatric/Behavioral: Negative for confusion, self-injury, sleep disturbance and suicidal ideas.       Vitals:    12/10/18 0924   BP: 102/66   Temp: 97.4 °F (36.3 °C)       Physical Exam   Constitutional: He is oriented to person, place, and time. He appears well-developed and well-nourished. He does not appear ill. No distress.   HENT:   Head: Normocephalic.   Eyes: Pupils are equal, round, and reactive to light.   Cardiovascular: Normal rate, regular rhythm and normal heart sounds.   Pulmonary/Chest: Effort normal and  breath sounds normal.   Abdominal: Soft. Bowel sounds are normal. He exhibits no distension and no mass. There is no hepatosplenomegaly. There is no tenderness. There is no rebound and no guarding. No hernia.   Musculoskeletal: Normal range of motion.   Neurological: He is alert and oriented to person, place, and time.   Skin: Skin is warm and dry.   Psychiatric: He has a normal mood and affect. His speech is normal and behavior is normal. Judgment normal.       No images are attached to the encounter.    Assessment & Plan    1. Gastroesophageal reflux disease, esophagitis presence not specified    2. Other iron deficiency anemia    3. Other chronic gastritis without hemorrhage    4. Diverticulosis of intestine without bleeding, unspecified intestinal tract location    I reviewed labs with him today. Hgb is stable. No signs of GI bleeding noted. GERD is well controlled on omeprazole. Follow up with me or Dr. Swenson in 6 months. Will recheck CBC in 4 months. Call office with any issues.

## 2019-03-31 ENCOUNTER — RESULTS ENCOUNTER (OUTPATIENT)
Dept: GASTROENTEROLOGY | Facility: CLINIC | Age: 79
End: 2019-03-31

## 2019-03-31 DIAGNOSIS — D64.9 ANEMIA, UNSPECIFIED TYPE: ICD-10-CM

## 2019-04-01 LAB
ERYTHROCYTE [DISTWIDTH] IN BLOOD BY AUTOMATED COUNT: 13.4 % (ref 12.3–15.4)
HCT VFR BLD AUTO: 39.7 % (ref 37.5–51)
HGB BLD-MCNC: 12.2 G/DL (ref 13–17.7)
MCH RBC QN AUTO: 29.7 PG (ref 26.6–33)
MCHC RBC AUTO-ENTMCNC: 30.7 G/DL (ref 31.5–35.7)
MCV RBC AUTO: 96.6 FL (ref 79–97)
PLATELET # BLD AUTO: 199 10*3/MM3 (ref 140–450)
RBC # BLD AUTO: 4.11 10*6/MM3 (ref 4.14–5.8)
WBC # BLD AUTO: 6.49 10*3/MM3 (ref 3.4–10.8)

## 2019-04-10 ENCOUNTER — OFFICE VISIT (OUTPATIENT)
Dept: GASTROENTEROLOGY | Facility: CLINIC | Age: 79
End: 2019-04-10

## 2019-04-10 VITALS
BODY MASS INDEX: 31.53 KG/M2 | WEIGHT: 225.2 LBS | DIASTOLIC BLOOD PRESSURE: 68 MMHG | SYSTOLIC BLOOD PRESSURE: 126 MMHG | TEMPERATURE: 98 F | HEIGHT: 71 IN

## 2019-04-10 DIAGNOSIS — K29.50 OTHER CHRONIC GASTRITIS WITHOUT HEMORRHAGE: ICD-10-CM

## 2019-04-10 DIAGNOSIS — K21.00 GERD WITH ESOPHAGITIS: Primary | ICD-10-CM

## 2019-04-10 DIAGNOSIS — D50.9 IRON DEFICIENCY ANEMIA, UNSPECIFIED IRON DEFICIENCY ANEMIA TYPE: ICD-10-CM

## 2019-04-10 PROCEDURE — 99213 OFFICE O/P EST LOW 20 MIN: CPT | Performed by: NURSE PRACTITIONER

## 2019-04-10 NOTE — PROGRESS NOTES
Chief Complaint   Patient presents with   • Follow-up   • Anemia       Memo Bishop is a  79 y.o. male here for a follow up visit for GERD and Anemia.     HPI  80 yo m presents today for follow up visit for GERD, gastritis and iron deficiency anemia. He is a patient of Dr. Swenson. He was last seen in the office on 12/10/18. He has hx GERD/gastritis and esophagitis. He admits he is doing well with omeprazole. He denies any dysphagia, reflux, abd pain, N&V, diarrhea, constipation, rectal bleeding or melena. He admits his appetite is good and his weight is stable. He also has hx NAVEED and does well with daily iron and Vitamin C. He also takes Vitamin D and B12. His most recent Hgb was stable at 12.2.       Past Medical History:   Diagnosis Date   • Abnormal LFTs    • Anemia    • Cancer (CMS/HCC) 2007    prostate cancer   • GERD (gastroesophageal reflux disease)    • Hyperlipidemia    • Hypertension        Past Surgical History:   Procedure Laterality Date   • CARDIAC CATHETERIZATION     • CATARACT EXTRACTION     • COLONOSCOPY  2014    @[facility] per pt   • COLONOSCOPY N/A 6/7/2018    incomplete exam/ poor prep   • COLONOSCOPY N/A 8/27/2018    NBIH, diverticulosis in sigmoid/descending colon   • ENDOSCOPY N/A 10/14/2016    normal exam/path   • ENDOSCOPY N/A 8/27/2018    normal exam/path   • ERCP  2010    @ [facility]   • HERNIA REPAIR Bilateral     inguinal and umbilical   • LAPAROSCOPIC CHOLECYSTECTOMY     • PANCREAS SURGERY     • SPHINCTEROTOMY     • TONSILLECTOMY     • VASECTOMY         Scheduled Meds:    Continuous Infusions:  No current facility-administered medications for this visit.     PRN Meds:.    Allergies   Allergen Reactions   • Levaquin [Levofloxacin]        Social History     Socioeconomic History   • Marital status:      Spouse name: Not on file   • Number of children: Not on file   • Years of education: Not on file   • Highest education level: Not on file   Tobacco Use   • Smoking status:  Former Smoker     Packs/day: 1.00     Types: Cigarettes     Last attempt to quit: 1972     Years since quittin.3   • Smokeless tobacco: Never Used   Substance and Sexual Activity   • Alcohol use: Yes     Comment: social   • Drug use: No   • Sexual activity: Defer       Family History   Problem Relation Age of Onset   • Esophageal cancer Brother    • GIULIA disease Brother    • Prostate cancer Father    • GIULIA disease Father    • GIULIA disease Brother    • GIULIA disease Brother    • GIULIA disease Brother        Review of Systems   Constitutional: Negative for appetite change, chills, diaphoresis, fatigue, fever and unexpected weight change.   HENT: Negative for nosebleeds, postnasal drip, sore throat, trouble swallowing and voice change.    Respiratory: Negative for cough, choking, chest tightness, shortness of breath and wheezing.    Cardiovascular: Negative for chest pain.   Gastrointestinal: Negative for abdominal distention, abdominal pain, anal bleeding, blood in stool, constipation, diarrhea, nausea, rectal pain and vomiting.   Endocrine: Negative for polydipsia, polyphagia and polyuria.   Musculoskeletal: Negative for gait problem.   Skin: Negative for rash and wound.   Allergic/Immunologic: Negative for food allergies.   Neurological: Negative for dizziness, speech difficulty and light-headedness.   Psychiatric/Behavioral: Negative for confusion, self-injury, sleep disturbance and suicidal ideas.       Vitals:    04/10/19 1310   BP: 126/68   Temp: 98 °F (36.7 °C)       Physical Exam   Constitutional: He is oriented to person, place, and time. He appears well-developed and well-nourished. He does not appear ill. No distress.   HENT:   Head: Normocephalic.   Eyes: Pupils are equal, round, and reactive to light.   Cardiovascular: Normal rate, regular rhythm and normal heart sounds.   Pulmonary/Chest: Effort normal and breath sounds normal.   Abdominal: Soft. Bowel sounds are normal. He exhibits no distension and no  mass. There is no hepatosplenomegaly. There is no tenderness. There is no rebound and no guarding. No hernia.   Musculoskeletal: Normal range of motion.   Neurological: He is alert and oriented to person, place, and time.   Skin: Skin is warm and dry.   Psychiatric: He has a normal mood and affect. His speech is normal and behavior is normal. Judgment normal.       No images are attached to the encounter.    Assessment & Plan    1. GERD with esophagitis    2. Other chronic gastritis without hemorrhage    3. Iron deficiency anemia, unspecified iron deficiency anemia type    GERD is well controlled with PPI daily. NAVEED is stable on iron. Hgb stable at 12.2. No signs of GI bleeding noted. Call office with any issues. Follow up with me in 6 months.

## 2019-04-18 ENCOUNTER — TELEPHONE (OUTPATIENT)
Dept: GASTROENTEROLOGY | Facility: CLINIC | Age: 79
End: 2019-04-18

## 2019-08-17 ENCOUNTER — APPOINTMENT (OUTPATIENT)
Dept: CT IMAGING | Facility: HOSPITAL | Age: 79
End: 2019-08-17

## 2019-08-17 ENCOUNTER — HOSPITAL ENCOUNTER (EMERGENCY)
Facility: HOSPITAL | Age: 79
Discharge: HOME | End: 2019-08-17
Attending: EMERGENCY MEDICINE | Admitting: EMERGENCY MEDICINE

## 2019-08-17 ENCOUNTER — APPOINTMENT (OUTPATIENT)
Dept: GENERAL RADIOLOGY | Facility: HOSPITAL | Age: 79
End: 2019-08-17

## 2019-08-17 VITALS
DIASTOLIC BLOOD PRESSURE: 66 MMHG | OXYGEN SATURATION: 94 % | RESPIRATION RATE: 16 BRPM | HEART RATE: 56 BPM | WEIGHT: 232 LBS | TEMPERATURE: 98.2 F | BODY MASS INDEX: 33.21 KG/M2 | SYSTOLIC BLOOD PRESSURE: 159 MMHG | HEIGHT: 70 IN

## 2019-08-17 DIAGNOSIS — K57.92 DIVERTICULITIS: Primary | ICD-10-CM

## 2019-08-17 LAB
ALBUMIN SERPL-MCNC: 3.6 G/DL (ref 3.5–5.2)
ALBUMIN/GLOB SERPL: 1.4 G/DL
ALP SERPL-CCNC: 115 U/L (ref 39–117)
ALT SERPL W P-5'-P-CCNC: 13 U/L (ref 1–41)
ANION GAP SERPL CALCULATED.3IONS-SCNC: 10.2 MMOL/L (ref 5–15)
AST SERPL-CCNC: 14 U/L (ref 1–40)
BASOPHILS # BLD AUTO: 0.03 10*3/MM3 (ref 0–0.2)
BASOPHILS NFR BLD AUTO: 0.3 % (ref 0–1.5)
BILIRUB SERPL-MCNC: 0.5 MG/DL (ref 0.2–1.2)
BILIRUB UR QL STRIP: NEGATIVE
BUN BLD-MCNC: 20 MG/DL (ref 8–23)
BUN/CREAT SERPL: 24.1 (ref 7–25)
CALCIUM SPEC-SCNC: 8.9 MG/DL (ref 8.6–10.5)
CHLORIDE SERPL-SCNC: 105 MMOL/L (ref 98–107)
CLARITY UR: CLEAR
CO2 SERPL-SCNC: 25.8 MMOL/L (ref 22–29)
COLOR UR: YELLOW
CREAT BLD-MCNC: 0.83 MG/DL (ref 0.76–1.27)
DEPRECATED RDW RBC AUTO: 45.2 FL (ref 37–54)
EOSINOPHIL # BLD AUTO: 0.22 10*3/MM3 (ref 0–0.4)
EOSINOPHIL NFR BLD AUTO: 2 % (ref 0.3–6.2)
ERYTHROCYTE [DISTWIDTH] IN BLOOD BY AUTOMATED COUNT: 13.2 % (ref 12.3–15.4)
GFR SERPL CREATININE-BSD FRML MDRD: 89 ML/MIN/1.73
GLOBULIN UR ELPH-MCNC: 2.6 GM/DL
GLUCOSE BLD-MCNC: 104 MG/DL (ref 65–99)
GLUCOSE UR STRIP-MCNC: NEGATIVE MG/DL
HCT VFR BLD AUTO: 39.4 % (ref 37.5–51)
HGB BLD-MCNC: 12.3 G/DL (ref 13–17.7)
HGB UR QL STRIP.AUTO: NEGATIVE
HOLD SPECIMEN: NORMAL
HOLD SPECIMEN: NORMAL
IMM GRANULOCYTES # BLD AUTO: 0.04 10*3/MM3 (ref 0–0.05)
IMM GRANULOCYTES NFR BLD AUTO: 0.4 % (ref 0–0.5)
INR PPP: 1.1 (ref 0.9–1.1)
KETONES UR QL STRIP: ABNORMAL
LEUKOCYTE ESTERASE UR QL STRIP.AUTO: NEGATIVE
LIPASE SERPL-CCNC: 13 U/L (ref 13–60)
LYMPHOCYTES # BLD AUTO: 1.2 10*3/MM3 (ref 0.7–3.1)
LYMPHOCYTES NFR BLD AUTO: 10.8 % (ref 19.6–45.3)
MCH RBC QN AUTO: 28.9 PG (ref 26.6–33)
MCHC RBC AUTO-ENTMCNC: 31.2 G/DL (ref 31.5–35.7)
MCV RBC AUTO: 92.5 FL (ref 79–97)
MONOCYTES # BLD AUTO: 1.19 10*3/MM3 (ref 0.1–0.9)
MONOCYTES NFR BLD AUTO: 10.7 % (ref 5–12)
NEUTROPHILS # BLD AUTO: 8.4 10*3/MM3 (ref 1.7–7)
NEUTROPHILS NFR BLD AUTO: 75.8 % (ref 42.7–76)
NITRITE UR QL STRIP: NEGATIVE
NRBC BLD AUTO-RTO: 0 /100 WBC (ref 0–0.2)
PH UR STRIP.AUTO: 6 [PH] (ref 5–8)
PLATELET # BLD AUTO: 192 10*3/MM3 (ref 140–450)
PMV BLD AUTO: 10.9 FL (ref 6–12)
POTASSIUM BLD-SCNC: 4.5 MMOL/L (ref 3.5–5.2)
PROT SERPL-MCNC: 6.2 G/DL (ref 6–8.5)
PROT UR QL STRIP: NEGATIVE
PROTHROMBIN TIME: 13.9 SECONDS (ref 11.7–14.2)
RBC # BLD AUTO: 4.26 10*6/MM3 (ref 4.14–5.8)
SODIUM BLD-SCNC: 141 MMOL/L (ref 136–145)
SP GR UR STRIP: 1.02 (ref 1–1.03)
TROPONIN T SERPL-MCNC: <0.01 NG/ML (ref 0–0.03)
UROBILINOGEN UR QL STRIP: ABNORMAL
WBC NRBC COR # BLD: 11.08 10*3/MM3 (ref 3.4–10.8)
WHOLE BLOOD HOLD SPECIMEN: NORMAL
WHOLE BLOOD HOLD SPECIMEN: NORMAL

## 2019-08-17 PROCEDURE — 93005 ELECTROCARDIOGRAM TRACING: CPT | Performed by: EMERGENCY MEDICINE

## 2019-08-17 PROCEDURE — 85610 PROTHROMBIN TIME: CPT | Performed by: EMERGENCY MEDICINE

## 2019-08-17 PROCEDURE — 83690 ASSAY OF LIPASE: CPT | Performed by: EMERGENCY MEDICINE

## 2019-08-17 PROCEDURE — 84484 ASSAY OF TROPONIN QUANT: CPT | Performed by: EMERGENCY MEDICINE

## 2019-08-17 PROCEDURE — 81003 URINALYSIS AUTO W/O SCOPE: CPT | Performed by: EMERGENCY MEDICINE

## 2019-08-17 PROCEDURE — 96375 TX/PRO/DX INJ NEW DRUG ADDON: CPT

## 2019-08-17 PROCEDURE — 93010 ELECTROCARDIOGRAM REPORT: CPT | Performed by: INTERNAL MEDICINE

## 2019-08-17 PROCEDURE — 25010000002 ONDANSETRON PER 1 MG: Performed by: EMERGENCY MEDICINE

## 2019-08-17 PROCEDURE — 25010000002 HYDROMORPHONE PER 4 MG: Performed by: EMERGENCY MEDICINE

## 2019-08-17 PROCEDURE — 71045 X-RAY EXAM CHEST 1 VIEW: CPT

## 2019-08-17 PROCEDURE — 99284 EMERGENCY DEPT VISIT MOD MDM: CPT

## 2019-08-17 PROCEDURE — 74177 CT ABD & PELVIS W/CONTRAST: CPT

## 2019-08-17 PROCEDURE — 80053 COMPREHEN METABOLIC PANEL: CPT | Performed by: EMERGENCY MEDICINE

## 2019-08-17 PROCEDURE — 85025 COMPLETE CBC W/AUTO DIFF WBC: CPT | Performed by: EMERGENCY MEDICINE

## 2019-08-17 PROCEDURE — 96374 THER/PROPH/DIAG INJ IV PUSH: CPT

## 2019-08-17 PROCEDURE — 25010000002 IOPAMIDOL 61 % SOLUTION: Performed by: EMERGENCY MEDICINE

## 2019-08-17 RX ORDER — HYDROCODONE BITARTRATE AND ACETAMINOPHEN 5; 325 MG/1; MG/1
1 TABLET ORAL 4 TIMES DAILY PRN
Qty: 10 TABLET | Refills: 0 | Status: ON HOLD | OUTPATIENT
Start: 2019-08-17 | End: 2019-10-29

## 2019-08-17 RX ORDER — ONDANSETRON 4 MG/1
4 TABLET, FILM COATED ORAL EVERY 8 HOURS PRN
Qty: 10 TABLET | Refills: 0 | Status: ON HOLD | OUTPATIENT
Start: 2019-08-17 | End: 2019-10-29

## 2019-08-17 RX ORDER — SODIUM CHLORIDE 0.9 % (FLUSH) 0.9 %
10 SYRINGE (ML) INJECTION AS NEEDED
Status: DISCONTINUED | OUTPATIENT
Start: 2019-08-17 | End: 2019-08-17 | Stop reason: HOSPADM

## 2019-08-17 RX ORDER — ONDANSETRON 2 MG/ML
4 INJECTION INTRAMUSCULAR; INTRAVENOUS ONCE
Status: COMPLETED | OUTPATIENT
Start: 2019-08-17 | End: 2019-08-17

## 2019-08-17 RX ORDER — HYDROMORPHONE HYDROCHLORIDE 1 MG/ML
0.5 INJECTION, SOLUTION INTRAMUSCULAR; INTRAVENOUS; SUBCUTANEOUS ONCE
Status: COMPLETED | OUTPATIENT
Start: 2019-08-17 | End: 2019-08-17

## 2019-08-17 RX ORDER — AMOXICILLIN AND CLAVULANATE POTASSIUM 875; 125 MG/1; MG/1
1 TABLET, FILM COATED ORAL EVERY 12 HOURS
Qty: 14 TABLET | Refills: 0 | Status: ON HOLD | OUTPATIENT
Start: 2019-08-17 | End: 2019-10-29

## 2019-08-17 RX ADMIN — ONDANSETRON 4 MG: 2 INJECTION INTRAMUSCULAR; INTRAVENOUS at 19:06

## 2019-08-17 RX ADMIN — SODIUM CHLORIDE 500 ML: 9 INJECTION, SOLUTION INTRAVENOUS at 19:06

## 2019-08-17 RX ADMIN — HYDROMORPHONE HYDROCHLORIDE 0.5 MG: 1 INJECTION, SOLUTION INTRAMUSCULAR; INTRAVENOUS; SUBCUTANEOUS at 19:07

## 2019-08-17 RX ADMIN — IOPAMIDOL 85 ML: 612 INJECTION, SOLUTION INTRAVENOUS at 20:16

## 2019-08-17 NOTE — ED PROVIDER NOTES
EMERGENCY DEPARTMENT ENCOUNTER    CHIEF COMPLAINT  Chief Complaint: Abdominal pain  History given by: Patient  History limited by: n/a  Room Number: 15/15  PMD: Kristen Hayes APRN      HPI:  Pt is a 79 y.o. male who presents complaining of abdominal pain that began 4 days ago and has gradually worsened despite taking Advil. Pain located below sternum and radiates down abdomen to umbilicus. Patient reports he has had similar episodes of pain in the past and has been followed by Dr. Swenson. No associated n/v/d. No known fever or chills. Patient states he took 1 dose of previously prescribed cipro around 1200 this afternoon in case pain was related to infectious process.   Hx of cholecystectomy, hernia repair    Duration/Onset/Timin days, gradual  Location: abdomen  Radiation: from sternum to umbilicus  Quality: 'pain'  Intensity/Severity: mild-moderate  Associated Symptoms: none specified  Aggravating or Alleviating Factors: none specified  Previous Episodes: similar episodes of pain in the past       PAST MEDICAL HISTORY  Active Ambulatory Problems     Diagnosis Date Noted   • Encounter for colorectal cancer screening 2018   • GERD with esophagitis 2018     Resolved Ambulatory Problems     Diagnosis Date Noted   • No Resolved Ambulatory Problems     Past Medical History:   Diagnosis Date   • Abnormal LFTs    • Anemia    • Cancer (CMS/HCC)    • GERD (gastroesophageal reflux disease)    • Hyperlipidemia    • Hypertension        PAST SURGICAL HISTORY  Past Surgical History:   Procedure Laterality Date   • CARDIAC CATHETERIZATION     • CATARACT EXTRACTION     • COLONOSCOPY      @[facility] per pt   • COLONOSCOPY N/A 2018    incomplete exam/ poor prep   • COLONOSCOPY N/A 2018    NBIH, diverticulosis in sigmoid/descending colon   • ENDOSCOPY N/A 10/14/2016    normal exam/path   • ENDOSCOPY N/A 2018    normal exam/path   • ERCP      @ [facility]   • HERNIA REPAIR Bilateral      inguinal and umbilical   • LAPAROSCOPIC CHOLECYSTECTOMY     • PANCREAS SURGERY     • SPHINCTEROTOMY     • TONSILLECTOMY     • VASECTOMY         FAMILY HISTORY  Family History   Problem Relation Age of Onset   • Esophageal cancer Brother    • GIULIA disease Brother    • Prostate cancer Father    • GIULIA disease Father    • GIULIA disease Brother    • GIULIA disease Brother    • GIULIA disease Brother        SOCIAL HISTORY  Social History     Socioeconomic History   • Marital status:      Spouse name: Not on file   • Number of children: Not on file   • Years of education: Not on file   • Highest education level: Not on file   Tobacco Use   • Smoking status: Former Smoker     Packs/day: 1.00     Types: Cigarettes     Last attempt to quit:      Years since quittin.6   • Smokeless tobacco: Never Used   Substance and Sexual Activity   • Alcohol use: Yes     Comment: social   • Drug use: No   • Sexual activity: Defer       ALLERGIES  Levaquin [levofloxacin]    REVIEW OF SYSTEMS  Review of Systems   Constitutional: Negative for activity change, appetite change and fever.   HENT: Negative for congestion and sore throat.    Eyes: Negative.    Respiratory: Negative for cough and shortness of breath.    Cardiovascular: Negative for chest pain and leg swelling.   Gastrointestinal: Positive for abdominal pain. Negative for diarrhea and vomiting.   Endocrine: Negative.    Genitourinary: Negative for decreased urine volume and dysuria.   Musculoskeletal: Negative for neck pain.   Skin: Negative for rash and wound.   Allergic/Immunologic: Negative.    Neurological: Negative for weakness, numbness and headaches.   Hematological: Negative.    Psychiatric/Behavioral: Negative.    All other systems reviewed and are negative.      PHYSICAL EXAM  ED Triage Vitals   Temp Heart Rate Resp BP SpO2   19 1842 19 1842 19 1842 19 1850 19 184   98.2 °F (36.8 °C) 64 18 179/74 94 %      Temp src Heart Rate Source  Patient Position BP Location FiO2 (%)   08/17/19 1842 08/17/19 1842 -- -- --   Tympanic Monitor          Physical Exam   Constitutional: He is oriented to person, place, and time and well-developed, well-nourished, and in no distress. No distress.   HENT:   Head: Normocephalic and atraumatic.   Mouth/Throat: Oropharynx is clear and moist.   Eyes: Conjunctivae are normal. Pupils are equal, round, and reactive to light.   Neck: Normal range of motion.   Cardiovascular: Normal rate, regular rhythm and normal heart sounds.   Pulmonary/Chest: Effort normal and breath sounds normal. No respiratory distress. He has no wheezes. He has no rales. He exhibits no tenderness.   Abdominal: Bowel sounds are hypoactive. There is no tenderness.   Musculoskeletal: He exhibits no edema or tenderness.   Neurological: He is alert and oriented to person, place, and time.   Skin: No rash noted.   Nursing note and vitals reviewed.      LAB RESULTS  Lab Results (last 24 hours)     Procedure Component Value Units Date/Time    CBC & Differential [[id number]] Collected:  08/17/19 1851    Specimen:  Blood Updated:  08/17/19 1912    Narrative:       The following orders were created for panel order CBC & Differential.  Procedure                               Abnormality         Status                     ---------                               -----------         ------                     CBC Auto Differential[[id number]]        Abnormal            Final result                 Please view results for these tests on the individual orders.    Comprehensive Metabolic Panel [[id number]]  (Abnormal) Collected:  08/17/19 1851    Specimen:  Blood Updated:  08/17/19 1931     Glucose 104 mg/dL      BUN 20 mg/dL      Creatinine 0.83 mg/dL      Sodium 141 mmol/L      Potassium 4.5 mmol/L      Chloride 105 mmol/L      CO2 25.8 mmol/L      Calcium 8.9 mg/dL      Total Protein 6.2 g/dL      Albumin 3.60 g/dL      ALT (SGPT) 13 U/L      AST (SGOT) 14 U/L       Alkaline Phosphatase 115 U/L      Total Bilirubin 0.5 mg/dL      eGFR Non African Amer 89 mL/min/1.73      Globulin 2.6 gm/dL      A/G Ratio 1.4 g/dL      BUN/Creatinine Ratio 24.1     Anion Gap 10.2 mmol/L     Narrative:       GFR Normal >60  Chronic Kidney Disease <60  Kidney Failure <15    Protime-INR [[id number]]  (Normal) Collected:  08/17/19 1851    Specimen:  Blood Updated:  08/17/19 1935     Protime 13.9 Seconds      INR 1.10    Lipase [[id number]]  (Normal) Collected:  08/17/19 1851    Specimen:  Blood Updated:  08/17/19 1931     Lipase 13 U/L     Troponin [[id number]]  (Normal) Collected:  08/17/19 1851    Specimen:  Blood Updated:  08/17/19 1931     Troponin T <0.010 ng/mL     Narrative:       Troponin T Reference Range:  <= 0.03 ng/mL-   Negative for AMI  >0.03 ng/mL-     Abnormal for myocardial necrosis.  Clinicians would have to utilize clinical acumen, EKG, Troponin and serial changes to determine if it is an Acute Myocardial Infarction or myocardial injury due to an underlying chronic condition.     CBC Auto Differential [[id number]]  (Abnormal) Collected:  08/17/19 1851    Specimen:  Blood Updated:  08/17/19 1912     WBC 11.08 10*3/mm3      RBC 4.26 10*6/mm3      Hemoglobin 12.3 g/dL      Hematocrit 39.4 %      MCV 92.5 fL      MCH 28.9 pg      MCHC 31.2 g/dL      RDW 13.2 %      RDW-SD 45.2 fl      MPV 10.9 fL      Platelets 192 10*3/mm3      Neutrophil % 75.8 %      Lymphocyte % 10.8 %      Monocyte % 10.7 %      Eosinophil % 2.0 %      Basophil % 0.3 %      Immature Grans % 0.4 %      Neutrophils, Absolute 8.40 10*3/mm3      Lymphocytes, Absolute 1.20 10*3/mm3      Monocytes, Absolute 1.19 10*3/mm3      Eosinophils, Absolute 0.22 10*3/mm3      Basophils, Absolute 0.03 10*3/mm3      Immature Grans, Absolute 0.04 10*3/mm3      nRBC 0.0 /100 WBC     Urinalysis With Microscopic If Indicated (No Culture) - Urine, Clean Catch [[id number]]  (Abnormal) Collected:  08/17/19 1949    Specimen:  Urine, Clean  Catch Updated:  08/17/19 1956     Color, UA Yellow     Appearance, UA Clear     pH, UA 6.0     Specific Gravity, UA 1.024     Glucose, UA Negative     Ketones, UA Trace     Bilirubin, UA Negative     Blood, UA Negative     Protein, UA Negative     Leuk Esterase, UA Negative     Nitrite, UA Negative     Urobilinogen, UA 1.0 E.U./dL    Narrative:       Urine microscopic not indicated.          I ordered the above labs and reviewed the results    RADIOLOGY  CT Abdomen Pelvis With Contrast   Final Result   IMPRESSION :    1. Low-density renal lesions as discussed.   2. Localized proximal small bowel inflammatory process, suspect acute   diverticulitis. Recommend follow-up.   3. Extensive colonic diverticulosis.   4. Trace ascites           This report was finalized on 8/17/2019 8:34 PM by Rachid Presley M.D.          XR Chest 1 View   Final Result   Cardiomegaly. No convincing evidence for active disease in   the chest. 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 This could be   directly compared to previous x-rays if possible. There is mild linear   scarring or atelectasis at the right lung base.       This report was finalized on 8/17/2019 7:39 PM by Dr. Neymar Klein M.D.               I ordered the above noted radiological studies. Interpreted by radiologist. . Reviewed by me in PACS.     EKG        EKG time: 1851  Rhythm/Rate: NSR, 61  1st degree AV block, RBBB    Interpreted Contemporaneously by me, independently viewed  No prior for comparison    PROCEDURES  Procedures      PROGRESS AND CONSULTS     1851: Blood work, UA, CXR ordered.     1900: CT abd/pelvis, IV dilaudid 0.5mg/zofran ordered for pain management.     2047: Patient rechecked. Reviewed results of ED workup. Patient states he has known history of diverticulosis but never been diagnosed with diverticulitis.  Discussed plan for discharge home with PO antibiotics along with pain and nausea medication. Instructed patient to eat clear/liquid diet over the next 24 hours and to follow up with Dr. Swenson. He voiced understanding and is agreeable to plan.     MEDICAL DECISION MAKING  Results were reviewed/discussed with the patient and they were also made aware of online access. Pt also made aware that some labs, such as cultures, will not be resulted during ER visit and follow up with PMD is necessary.     MDM  Number of Diagnoses or Management Options     Amount and/or Complexity of Data Reviewed  Clinical lab tests: ordered and reviewed  Tests in the radiology section of CPT®: ordered and reviewed           DIAGNOSIS  Final diagnoses:   Diverticulitis       DISPOSITION  DISCHARGE    Patient discharged in stable condition.    Reviewed implications of results, diagnosis, meds, responsibility to follow up, warning signs and symptoms of possible worsening, potential complications and reasons to return to ER.    Patient/Family voiced understanding of above instructions.    Discussed plan for discharge, as there is no emergent indication for admission. Patient referred to primary care provider for BP management due to today's BP. Pt/family is agreeable and understands need for follow up and repeat testing.  Pt is aware that discharge does not mean that nothing is wrong but it indicates no emergency is present that requires admission and they must continue care with follow-up as given below or physician of their choice.     FOLLOW-UP  Fei Swenson MD  [address] [place], [place] [id number] [phone]    In 2 days  For recheck         Medication List      New Prescriptions    amoxicillin-clavulanate 875-125 MG per tablet  Commonly known as:  AUGMENTIN  Take 1 tablet by mouth Every 12 (Twelve) Hours.     HYDROcodone-acetaminophen 5-325 MG per tablet  Commonly known as:  NORCO  Take 1 tablet by mouth 4 (Four) Times a Day  As Needed for Moderate Pain .     ondansetron 4 MG tablet  Commonly known as:  ZOFRAN  Take 1 tablet by mouth Every 8 (Eight) Hours As Needed for Nausea or   Vomiting.                Latest Documented Vital Signs:  As of 10:03 PM  BP- 159/66 HR- 56 Temp- 98.2 °F (36.8 °C) (Tympanic) O2 sat- 94%    --  Documentation assistance provided by melissa Low for Dr. Neymar Cuadra MD.  Information recorded by the scribe was done at my direction and has been verified and validated by me.            Maryam Low  08/17/19 2053       Neymar Cuadra MD  08/17/19 2201

## 2019-10-29 ENCOUNTER — HOSPITAL ENCOUNTER (INPATIENT)
Facility: HOSPITAL | Age: 79
LOS: 2 days | Discharge: HOME | End: 2019-10-31
Attending: EMERGENCY MEDICINE | Admitting: SURGERY

## 2019-10-29 ENCOUNTER — APPOINTMENT (OUTPATIENT)
Dept: CT IMAGING | Facility: HOSPITAL | Age: 79
End: 2019-10-29

## 2019-10-29 DIAGNOSIS — K56.609 SBO (SMALL BOWEL OBSTRUCTION) (HCC): Primary | ICD-10-CM

## 2019-10-29 LAB
ALBUMIN SERPL-MCNC: 3.9 G/DL (ref 3.5–5.2)
ALBUMIN/GLOB SERPL: 1.5 G/DL
ALP SERPL-CCNC: 99 U/L (ref 39–117)
ALT SERPL W P-5'-P-CCNC: 19 U/L (ref 1–41)
ANION GAP SERPL CALCULATED.3IONS-SCNC: 7.2 MMOL/L (ref 5–15)
AST SERPL-CCNC: 19 U/L (ref 1–40)
BASOPHILS # BLD AUTO: 0.02 10*3/MM3 (ref 0–0.2)
BASOPHILS NFR BLD AUTO: 0.4 % (ref 0–1.5)
BILIRUB SERPL-MCNC: 0.4 MG/DL (ref 0.2–1.2)
BUN BLD-MCNC: 17 MG/DL (ref 8–23)
BUN/CREAT SERPL: 19.5 (ref 7–25)
CALCIUM SPEC-SCNC: 9 MG/DL (ref 8.6–10.5)
CHLORIDE SERPL-SCNC: 102 MMOL/L (ref 98–107)
CO2 SERPL-SCNC: 28.8 MMOL/L (ref 22–29)
CREAT BLD-MCNC: 0.87 MG/DL (ref 0.76–1.27)
DEPRECATED RDW RBC AUTO: 41.7 FL (ref 37–54)
EOSINOPHIL # BLD AUTO: 0.31 10*3/MM3 (ref 0–0.4)
EOSINOPHIL NFR BLD AUTO: 5.5 % (ref 0.3–6.2)
ERYTHROCYTE [DISTWIDTH] IN BLOOD BY AUTOMATED COUNT: 12.8 % (ref 12.3–15.4)
GFR SERPL CREATININE-BSD FRML MDRD: 85 ML/MIN/1.73
GLOBULIN UR ELPH-MCNC: 2.6 GM/DL
GLUCOSE BLD-MCNC: 99 MG/DL (ref 65–99)
HCT VFR BLD AUTO: 37.2 % (ref 37.5–51)
HGB BLD-MCNC: 12.7 G/DL (ref 13–17.7)
HOLD SPECIMEN: NORMAL
HOLD SPECIMEN: NORMAL
IMM GRANULOCYTES # BLD AUTO: 0.02 10*3/MM3 (ref 0–0.05)
IMM GRANULOCYTES NFR BLD AUTO: 0.4 % (ref 0–0.5)
LIPASE SERPL-CCNC: 29 U/L (ref 13–60)
LYMPHOCYTES # BLD AUTO: 1.13 10*3/MM3 (ref 0.7–3.1)
LYMPHOCYTES NFR BLD AUTO: 20 % (ref 19.6–45.3)
MCH RBC QN AUTO: 31 PG (ref 26.6–33)
MCHC RBC AUTO-ENTMCNC: 34.1 G/DL (ref 31.5–35.7)
MCV RBC AUTO: 90.7 FL (ref 79–97)
MONOCYTES # BLD AUTO: 0.66 10*3/MM3 (ref 0.1–0.9)
MONOCYTES NFR BLD AUTO: 11.7 % (ref 5–12)
NEUTROPHILS # BLD AUTO: 3.52 10*3/MM3 (ref 1.7–7)
NEUTROPHILS NFR BLD AUTO: 62 % (ref 42.7–76)
NRBC BLD AUTO-RTO: 0 /100 WBC (ref 0–0.2)
PLATELET # BLD AUTO: 189 10*3/MM3 (ref 140–450)
PMV BLD AUTO: 10.1 FL (ref 6–12)
POTASSIUM BLD-SCNC: 4.2 MMOL/L (ref 3.5–5.2)
PROT SERPL-MCNC: 6.5 G/DL (ref 6–8.5)
RBC # BLD AUTO: 4.1 10*6/MM3 (ref 4.14–5.8)
SODIUM BLD-SCNC: 138 MMOL/L (ref 136–145)
WBC NRBC COR # BLD: 5.66 10*3/MM3 (ref 3.4–10.8)
WHOLE BLOOD HOLD SPECIMEN: NORMAL
WHOLE BLOOD HOLD SPECIMEN: NORMAL

## 2019-10-29 PROCEDURE — 83690 ASSAY OF LIPASE: CPT | Performed by: EMERGENCY MEDICINE

## 2019-10-29 PROCEDURE — 85025 COMPLETE CBC W/AUTO DIFF WBC: CPT | Performed by: EMERGENCY MEDICINE

## 2019-10-29 PROCEDURE — 25010000002 IOPAMIDOL 61 % SOLUTION: Performed by: EMERGENCY MEDICINE

## 2019-10-29 PROCEDURE — 80053 COMPREHEN METABOLIC PANEL: CPT | Performed by: EMERGENCY MEDICINE

## 2019-10-29 PROCEDURE — 74177 CT ABD & PELVIS W/CONTRAST: CPT

## 2019-10-29 PROCEDURE — 99284 EMERGENCY DEPT VISIT MOD MDM: CPT

## 2019-10-29 RX ORDER — LISINOPRIL 40 MG/1
40 TABLET ORAL DAILY
Status: DISCONTINUED | OUTPATIENT
Start: 2019-10-30 | End: 2019-10-31 | Stop reason: HOSPADM

## 2019-10-29 RX ORDER — SODIUM CHLORIDE 0.9 % (FLUSH) 0.9 %
10 SYRINGE (ML) INJECTION AS NEEDED
Status: DISCONTINUED | OUTPATIENT
Start: 2019-10-29 | End: 2019-10-31 | Stop reason: HOSPADM

## 2019-10-29 RX ORDER — NALOXONE HCL 0.4 MG/ML
0.4 VIAL (ML) INJECTION
Status: DISCONTINUED | OUTPATIENT
Start: 2019-10-29 | End: 2019-10-31 | Stop reason: HOSPADM

## 2019-10-29 RX ORDER — SODIUM CHLORIDE 9 MG/ML
100 INJECTION, SOLUTION INTRAVENOUS CONTINUOUS
Status: DISCONTINUED | OUTPATIENT
Start: 2019-10-29 | End: 2019-10-31 | Stop reason: HOSPADM

## 2019-10-29 RX ORDER — SODIUM CHLORIDE 0.9 % (FLUSH) 0.9 %
10 SYRINGE (ML) INJECTION EVERY 12 HOURS SCHEDULED
Status: DISCONTINUED | OUTPATIENT
Start: 2019-10-29 | End: 2019-10-31 | Stop reason: HOSPADM

## 2019-10-29 RX ORDER — HYDROMORPHONE HYDROCHLORIDE 1 MG/ML
0.5 INJECTION, SOLUTION INTRAMUSCULAR; INTRAVENOUS; SUBCUTANEOUS
Status: DISCONTINUED | OUTPATIENT
Start: 2019-10-29 | End: 2019-10-31 | Stop reason: HOSPADM

## 2019-10-29 RX ORDER — ATENOLOL 50 MG/1
100 TABLET ORAL
Status: DISCONTINUED | OUTPATIENT
Start: 2019-10-30 | End: 2019-10-31 | Stop reason: HOSPADM

## 2019-10-29 RX ORDER — ONDANSETRON 2 MG/ML
4 INJECTION INTRAMUSCULAR; INTRAVENOUS EVERY 6 HOURS PRN
Status: DISCONTINUED | OUTPATIENT
Start: 2019-10-29 | End: 2019-10-31 | Stop reason: HOSPADM

## 2019-10-29 RX ADMIN — IOPAMIDOL 85 ML: 612 INJECTION, SOLUTION INTRAVENOUS at 17:33

## 2019-10-29 RX ADMIN — SODIUM CHLORIDE 100 ML/HR: 9 INJECTION, SOLUTION INTRAVENOUS at 22:46

## 2019-10-29 RX ADMIN — SODIUM CHLORIDE, PRESERVATIVE FREE 10 ML: 5 INJECTION INTRAVENOUS at 22:46

## 2019-10-30 ENCOUNTER — TELEPHONE (OUTPATIENT)
Dept: GASTROENTEROLOGY | Facility: CLINIC | Age: 79
End: 2019-10-30

## 2019-10-30 ENCOUNTER — APPOINTMENT (OUTPATIENT)
Dept: GENERAL RADIOLOGY | Facility: HOSPITAL | Age: 79
End: 2019-10-30

## 2019-10-30 PROCEDURE — 90686 IIV4 VACC NO PRSV 0.5 ML IM: CPT | Performed by: SURGERY

## 2019-10-30 PROCEDURE — 99222 1ST HOSP IP/OBS MODERATE 55: CPT | Performed by: SURGERY

## 2019-10-30 PROCEDURE — G0008 ADMIN INFLUENZA VIRUS VAC: HCPCS | Performed by: SURGERY

## 2019-10-30 PROCEDURE — 74245: CPT

## 2019-10-30 PROCEDURE — 25010000002 INFLUENZA VAC SPLIT QUAD 0.5 ML SUSPENSION PREFILLED SYRINGE: Performed by: SURGERY

## 2019-10-30 RX ORDER — ACETAMINOPHEN 325 MG/1
650 TABLET ORAL EVERY 8 HOURS PRN
Status: DISCONTINUED | OUTPATIENT
Start: 2019-10-30 | End: 2019-10-31 | Stop reason: HOSPADM

## 2019-10-30 RX ADMIN — ATENOLOL 100 MG: 50 TABLET ORAL at 17:21

## 2019-10-30 RX ADMIN — LISINOPRIL 40 MG: 40 TABLET ORAL at 17:21

## 2019-10-30 RX ADMIN — SODIUM CHLORIDE 100 ML/HR: 9 INJECTION, SOLUTION INTRAVENOUS at 17:15

## 2019-10-30 RX ADMIN — SODIUM CHLORIDE 100 ML/HR: 9 INJECTION, SOLUTION INTRAVENOUS at 07:53

## 2019-10-30 RX ADMIN — SODIUM CHLORIDE, PRESERVATIVE FREE 10 ML: 5 INJECTION INTRAVENOUS at 20:55

## 2019-10-30 RX ADMIN — SODIUM CHLORIDE, PRESERVATIVE FREE 10 ML: 5 INJECTION INTRAVENOUS at 08:00

## 2019-10-30 RX ADMIN — ACETAMINOPHEN 650 MG: 325 TABLET, FILM COATED ORAL at 23:15

## 2019-10-30 RX ADMIN — INFLUENZA A VIRUS A/BRISBANE/02/2018 IVR-190 (H1N1) ANTIGEN (PROPIOLACTONE INACTIVATED), INFLUENZA A VIRUS A/KANSAS/14/2017 X-327 (H3N2) ANTIGEN (PROPIOLACTONE INACTIVATED), INFLUENZA B VIRUS B/MARYLAND/15/2016 ANTIGEN (PROPIOLACTONE INACTIVATED), INFLUENZA B VIRUS B/PHUKET/3073/2013 BVR-1B ANTIGEN (PROPIOLACTONE INACTIVATED) 0.5 ML: 15; 15; 15; 15 INJECTION, SUSPENSION INTRAMUSCULAR at 17:16

## 2019-10-30 NOTE — TELEPHONE ENCOUNTER
Called pt back. Pt states he had an appt with Dr Swenson this Friday that he had to cancel because he had to be admitted. He has a small bowel obstruction and he is headed toward surgery. He asked if they could call Dr Messi escalera to get his take on everything but they said they didn't really need his input. Pt is requesting Dr Swenson look over his admit records to to make sure everything looks ok. Advised will send the message but advised that Dr Memo Martinez is a fantastic surgeon and he is already in good hands. Pt appreciated the words of encouragement.

## 2019-10-30 NOTE — TELEPHONE ENCOUNTER
Per Dr. Swenson: Please call patient, I have read Dr. Martinez's note and agree with upper GI small bowel follow-through which is been ordered for today.  The results of this will determine whether surgery is necessary

## 2019-10-30 NOTE — TELEPHONE ENCOUNTER
Patient called, spokes with spouse Nikia(on [facility]). Advised as per Dr. Swenson's note. She states she will relay the message to the patient. She states he is currently in the process of getting his SBFT X-ray.

## 2019-10-30 NOTE — TELEPHONE ENCOUNTER
----- Message from Deepti Crandall sent at 10/30/2019  8:57 AM EDT -----  Regarding: Question  Contact: [phone]  Pt wants to talk with a nurse with some questions

## 2019-10-31 VITALS
SYSTOLIC BLOOD PRESSURE: 136 MMHG | OXYGEN SATURATION: 94 % | RESPIRATION RATE: 16 BRPM | TEMPERATURE: 97.9 F | WEIGHT: 223 LBS | DIASTOLIC BLOOD PRESSURE: 54 MMHG | BODY MASS INDEX: 31.92 KG/M2 | HEIGHT: 70 IN | HEART RATE: 58 BPM

## 2019-10-31 PROCEDURE — 99238 HOSP IP/OBS DSCHRG MGMT 30/<: CPT | Performed by: SURGERY

## 2019-10-31 RX ADMIN — SODIUM CHLORIDE 100 ML/HR: 9 INJECTION, SOLUTION INTRAVENOUS at 03:03

## 2019-11-07 ENCOUNTER — TELEPHONE (OUTPATIENT)
Dept: GASTROENTEROLOGY | Facility: CLINIC | Age: 79
End: 2019-11-07

## 2019-11-07 NOTE — TELEPHONE ENCOUNTER
----- Message from Anna Rogers RN sent at 2019  1:33 PM EST -----  Regarding: Pt left a message   Contact: [phone]  Left a message with his name,  and phone number,. Did not say what call was concerning. Please call back.

## 2019-11-14 NOTE — TELEPHONE ENCOUNTER
Pt did not leave info in his voicemail. He will call back if he has questions or he has an appt to see Ria on 11/18/19.

## 2019-11-18 ENCOUNTER — OFFICE VISIT (OUTPATIENT)
Dept: GASTROENTEROLOGY | Facility: CLINIC | Age: 79
End: 2019-11-18

## 2019-11-18 VITALS
BODY MASS INDEX: 32.67 KG/M2 | WEIGHT: 228.2 LBS | SYSTOLIC BLOOD PRESSURE: 142 MMHG | TEMPERATURE: 97.8 F | DIASTOLIC BLOOD PRESSURE: 80 MMHG | HEIGHT: 70 IN

## 2019-11-18 DIAGNOSIS — K21.9 GASTROESOPHAGEAL REFLUX DISEASE, ESOPHAGITIS PRESENCE NOT SPECIFIED: ICD-10-CM

## 2019-11-18 DIAGNOSIS — R10.33 PERIUMBILICAL ABDOMINAL PAIN: Primary | ICD-10-CM

## 2019-11-18 PROCEDURE — 99214 OFFICE O/P EST MOD 30 MIN: CPT | Performed by: NURSE PRACTITIONER

## 2019-11-18 NOTE — PROGRESS NOTES
Chief Complaint   Patient presents with   • Follow-up   • Abdominal Pain     HPI    Memo Bishop is a  79 y.o. male here for a follow up visit for abdominal pain.  Patient presents with multiple records from [place] for my review.    This is an established patient of Dr. Swenson's, new to me.  We have followed him for GERD and anemia.  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 Kamryn Blank    This patient was admitted to [facility] hospital last month for abdominal pain.  I reviewed hospital records.  Discharge summary as follows:    SUMMARY OF HOSPITAL COURSE:   Admitted to the hospital with acute on chronic upper abdominal pain and a CT scan that was concerning for but not definitive for bowel obstruction.  Previous CT had suggested small bowel diverticulitis.  Upper GI and small bowel follow-through was obtained and was completely normal.  His symptoms were minimal if not resolved at the time of discharge.  Given his extensive negative work-up including EGD and colonoscopy in the last year and repeat CT scans as well as now upper GI and small bowel follow-through, I feel the only remaining work-up if symptoms recur would be laparoscopy to evaluate peritoneal surfaces and run the small bowel.  He understands to call or return for follow-up if symptoms recur in a meaningful way.    Reviewed labs dated 10/29/2019: Lipase.  Normal CMP.  Hemogram with WBC 5.66, H/H 12.7/37.2.    Prior work-up which I reviewed:    EGD dated 8/27/2018--normal.  Colonoscopy dated 8/27/2018- NBIH.  Diverticulosis.  ---------------------------------------------------------------------------------------------------------  On visit today reports being pain-free since hospital discharge.  He experiences episodes of periumbilical pain onset 1 year ago.  When pain occurs it can last several hours to several days.  The pain is described as an dull deep  ache.  No relationship to eating or defecation.  His bowels are moving daily with soft stools.  He is prone to constipation if he travels out of the country but this is rare.  Denies diarrhea or rectal bleeding.  He continues on daily iron supplement.    He reports adequate control of GERD symptoms on once daily PPI therapy.  He has daily nausea mainly on awakening in the morning usually triggered by taking his morning medications relieves after eating breakfast.  No vomiting.  His appetite is good.  His weight is stable.  No dysphagia.    Past Medical History:   Diagnosis Date   • Abnormal LFTs    • Anemia    • Cancer (CMS/HCC) 2007    prostate cancer   • GERD (gastroesophageal reflux disease)    • Hyperlipidemia    • Hypertension        Past Surgical History:   Procedure Laterality Date   • CARDIAC CATHETERIZATION     • CATARACT EXTRACTION     • COLONOSCOPY  2014    @[place], [facility] per pt   • COLONOSCOPY N/A 6/7/2018    incomplete exam/ poor prep   • COLONOSCOPY N/A 8/27/2018    NBIH, diverticulosis in sigmoid/descending colon   • ENDOSCOPY N/A 10/14/2016    normal exam/path   • ENDOSCOPY N/A 8/27/2018    normal exam/path   • ERCP  2010    @ [facility]   • HERNIA REPAIR Bilateral     inguinal and umbilical   • LAPAROSCOPIC CHOLECYSTECTOMY     • SPHINCTEROTOMY     • TONSILLECTOMY     • VASECTOMY         Scheduled Meds:  Outpatient Encounter Medications as of 11/18/2019   Medication Sig Dispense Refill   • aspirin 325 MG tablet Take 325 mg by mouth Daily.     • atenolol (TENORMIN) 100 MG tablet      • atorvastatin (LIPITOR) 40 MG tablet Take 40 mg by mouth Daily.     • Cholecalciferol (VITAMIN D PO) Take  by mouth.     • cyclobenzaprine (FLEXERIL) 10 MG tablet Take 10 mg by mouth Daily As Needed.     • desoximetasone (TOPICORT) 0.25 % cream      • ferrous gluconate (FERGON) 240 (27 FE) MG tablet Take  by mouth Daily.     • lisinopril (PRINIVIL,ZESTRIL) 20 MG tablet Take 40 mg by mouth Daily.     • omeprazole  (PriLOSEC) 40 MG capsule      • oxybutynin (DITROPAN) 5 MG tablet Take 5 mg by mouth 3 (Three) Times a Day.     • vitamin B-12 (CYANOCOBALAMIN) 500 MCG tablet Take 500 mcg by mouth Daily.     • vitamin C (ASCORBIC ACID) 500 MG tablet Take 500 mg by mouth Daily.       No facility-administered encounter medications on file as of 2019.        Continuous Infusions:  No current facility-administered medications for this visit.     PRN Meds:.    Allergies   Allergen Reactions   • Levaquin [Levofloxacin]        Social History     Socioeconomic History   • Marital status:      Spouse name: Not on file   • Number of children: Not on file   • Years of education: Not on file   • Highest education level: Not on file   Tobacco Use   • Smoking status: Former Smoker     Packs/day: 1.00     Types: Cigarettes     Last attempt to quit: 1972     Years since quittin.9   • Smokeless tobacco: Never Used   Substance and Sexual Activity   • Alcohol use: Yes     Comment: social   • Drug use: No   • Sexual activity: Defer       Family History   Problem Relation Age of Onset   • Esophageal cancer Brother    • GIULIA disease Brother    • Prostate cancer Father    • GIULIA disease Father    • GIULIA disease Brother    • GIULIA disease Brother    • GIULIA disease Brother        Review of Systems   Constitutional: Negative for activity change, appetite change, fatigue, fever and unexpected weight change.   HENT: Negative for trouble swallowing.    Respiratory: Negative for apnea, cough, choking, chest tightness, shortness of breath and wheezing.    Cardiovascular: Negative for chest pain, palpitations and leg swelling.   Gastrointestinal: Positive for abdominal pain. Negative for abdominal distention, anal bleeding, blood in stool, constipation, diarrhea, nausea, rectal pain and vomiting.       Vitals:    19 0902   BP: 142/80   Temp: 97.8 °F (36.6 °C)       Physical Exam   Constitutional: He is oriented to person, place, and time. He  appears well-developed and well-nourished.   Eyes: Pupils are equal, round, and reactive to light.   Cardiovascular: Normal rate, regular rhythm and normal heart sounds.   Pulmonary/Chest: Effort normal and breath sounds normal. No respiratory distress. He has no wheezes.   Abdominal: Soft. Bowel sounds are normal. He exhibits no distension and no mass. There is no tenderness. There is no guarding. No hernia.   Musculoskeletal: Normal range of motion.   Neurological: He is alert and oriented to person, place, and time.   Skin: Skin is warm and dry. Capillary refill takes less than 2 seconds.   Psychiatric: He has a normal mood and affect. His behavior is normal.       No images are attached to the encounter.    Memo was seen today for follow-up and abdominal pain.    Diagnoses and all orders for this visit:    Periumbilical abdominal pain  Comments:  Complete Hemoccult cards x3    Gastroesophageal reflux disease, esophagitis presence not specified    Assessment/plan    79 year old male seen today in f/u for periumbilical pain.  Reviewed recent hospital discharge as summarized above.  EGD and colonoscopy performed last year essentially normal other than nonbleeding internal hemorrhoids.  Recent CT was concerning for small bowel obstruction but upper GI series and small bowel follow-through were entirely normal.  Patient was seen by Dr. Martinez laparoscopic evaluation was discussed but patient's pain resolved and he was able to be discharged.  He is been pain-free since hospitalization.  I discussed patient's current issues with Dr. Swenson.  At this point we will have him complete Hemoccult cards x3.  If found to be positive will move forward with capsule endoscopy.    Stable GERD, continue PPI therapy.    Follow-up and further recommendations pending Hemoccult card evaluation.

## 2019-11-19 ENCOUNTER — TELEPHONE (OUTPATIENT)
Dept: GASTROENTEROLOGY | Facility: CLINIC | Age: 79
End: 2019-11-19

## 2019-11-19 NOTE — TELEPHONE ENCOUNTER
----- Message from IDA Krishna sent at 11/18/2019  4:12 PM EST -----  Regarding: FW: hemoccult cards  Please notify patient that I spoke with Dr. Swenson regarding his current issues.  He would like for the patient to complete Hemoccult cards x3.  If found to be positive we will move forward with capsule endoscopy.    ----- Message -----  From: Fei Swenson MD  Sent: 11/18/2019  12:04 PM  To: IDA Krishna  Subject: hemoccult cards                                  He should have a capsule if he has heme positive stool.  So please send him home Hemoccult cards. thx!  ----- Message -----  From: Ria Bernard APRN  Sent: 11/18/2019  10:09 AM  To: Fei Swenson MD    Memo was seen in follow-up for periumbilical pain.  He was in the hospital last month seen by Dr. Martinez.  CT suggested possible small bowel obstruction.  Upper GI and small bowel follow-through was completely normal.  Possible exploratory lap was discussed but not performed.  EGD in 2018 for iron deficiency anemia was normal.  Colonoscopy also at that time with NBIH and diverticulosis.  Recent hemoglobin of 12.7 stable for the past 7 months.  Should we consider capsule study versus push enteroscopy?  Your thoughts?  BG

## 2019-11-19 NOTE — TELEPHONE ENCOUNTER
Called pt and advised of the note from Ria. Advised will leave the stool kit at the  of our office and he can pick it up at his convenience and complete the cards as he is able and return them to the office. He verb understanding and is in agreement with the plan. He states he was looking in the mirror yesterday afternoon and he noticed that his abdomen is not symmetrical. He states the left side is larger than the right had side all the way done into his pubic region. He wanted to let Ria know. Advised will update NP. Pt verb understanding.       Stool kit left at  for pt.

## 2019-12-02 ENCOUNTER — TELEPHONE (OUTPATIENT)
Dept: GASTROENTEROLOGY | Facility: CLINIC | Age: 79
End: 2019-12-02

## 2019-12-02 PROCEDURE — 82270 OCCULT BLOOD FECES: CPT | Performed by: INTERNAL MEDICINE

## 2019-12-03 NOTE — TELEPHONE ENCOUNTER
Just want to keep you in the loop on this patient.  He was in the hospital October for abdominal pain.  CT suggested small bowel diverticulitis but upper GI and small bowel follow-through were completely normal.  He has had an extensive negative work-up including EGD, colonoscopy last year and repeat CTs. He was seen by Dr. Martinez during his most recent hospitalization and laparoscopic evaluation was discussed.  I saw him in follow-up in November and he had been pain-free since hospital discharge.  Recent stool cards for occult blood are negative x 3. Thanks BG

## 2020-10-28 ENCOUNTER — TELEPHONE (OUTPATIENT)
Dept: GASTROENTEROLOGY | Facility: CLINIC | Age: 80
End: 2020-10-28

## 2020-11-10 ENCOUNTER — TELEPHONE (OUTPATIENT)
Dept: GASTROENTEROLOGY | Facility: CLINIC | Age: 80
End: 2020-11-10

## 2020-11-12 ENCOUNTER — OFFICE VISIT (OUTPATIENT)
Dept: GASTROENTEROLOGY | Facility: CLINIC | Age: 80
End: 2020-11-12

## 2020-11-12 ENCOUNTER — TELEPHONE (OUTPATIENT)
Dept: GASTROENTEROLOGY | Facility: CLINIC | Age: 80
End: 2020-11-12

## 2020-11-12 VITALS — HEIGHT: 70 IN | WEIGHT: 247 LBS | BODY MASS INDEX: 35.36 KG/M2

## 2020-11-12 DIAGNOSIS — R19.7 DIARRHEA, UNSPECIFIED TYPE: Primary | ICD-10-CM

## 2020-11-12 DIAGNOSIS — R15.2 FECAL URGENCY: ICD-10-CM

## 2020-11-12 PROCEDURE — 99214 OFFICE O/P EST MOD 30 MIN: CPT | Performed by: NURSE PRACTITIONER

## 2020-11-12 RX ORDER — CHOLECALCIFEROL (VITAMIN D3) 125 MCG
10 CAPSULE ORAL NIGHTLY PRN
COMMUNITY

## 2020-11-12 RX ORDER — DIPHENOXYLATE HYDROCHLORIDE AND ATROPINE SULFATE 2.5; .025 MG/1; MG/1
1 TABLET ORAL AS NEEDED
COMMUNITY
Start: 2020-10-02

## 2020-11-12 RX ORDER — INFLUENZA A VIRUS A/MICHIGAN/45/2015 X-275 (H1N1) ANTIGEN (FORMALDEHYDE INACTIVATED), INFLUENZA A VIRUS A/SINGAPORE/INFIMH-16-0019/2016 IVR-186 (H3N2) ANTIGEN (FORMALDEHYDE INACTIVATED), INFLUENZA B VIRUS B/PHUKET/3073/2013 ANTIGEN (FORMALDEHYDE INACTIVATED), AND INFLUENZA B VIRUS B/MARYLAND/15/2016 BX-69A ANTIGEN (FORMALDEHYDE INACTIVATED) 60; 60; 60; 60 UG/.7ML; UG/.7ML; UG/.7ML; UG/.7ML
INJECTION, SUSPENSION INTRAMUSCULAR
COMMUNITY
Start: 2020-10-03 | End: 2020-11-12

## 2020-11-12 NOTE — PROGRESS NOTES
Chief Complaint   Patient presents with   • Diarrhea   • Nausea     HPI    Memo Bishop is a  80 y.o. male here for a follow up visit for diarrhea. This is an established patient of Dr. Swenson's, known to me.  We have followed him for GERD and anemia.  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 Kamryn Blank EGD and colonoscopy performed 2018 essentially normal other than nonbleeding internal hemorrhoids.  Patient admitted to the hospital in December 2019 with CT concerning for small bowel obstruction but upper GI series and small bowel follow-through were entirely normal.  He was seen by Dr. Martinez at the time of laparoscopic evaluation was discussed with patient's pain resolved and he was able to be discharged.  Patient had heme-negative stool x3 at the time.    Today her concern is diarrhea symptoms onset 3 months ago.  He is prone to diarrhea about once a year after he returns from his annual trip to [place] but this year the trip was put on hold.  When diarrhea symptoms started he had some leftover Zithromax and Cipro that he took for 1 week with no change in symptoms.  Currently he is having up to 6 liquid stools a day with fecal urgency and excessive gas and bloating.  His PCP gave him antidiarrheals which do seem to bulk up his stool.  He is on a daily fiber supplement and a probiotic.  Prior to this change in bowel habits he was having 1-2 formed bowel movements a day.  He denies weight loss, abdominal pain, rectal pain, rectal bleeding.  He had some stool testing done at [facility] but we do not have a copy of the results.  He just had labs done at [facility] as well with his PCP.    Past Medical History:   Diagnosis Date   • Abnormal LFTs    • Anemia    • Cancer (CMS/HCC) 2007    prostate cancer   • GERD (gastroesophageal reflux disease)    • Hyperlipidemia    • Hypertension        Past Surgical History:   Procedure  Laterality Date   • CARDIAC CATHETERIZATION     • CATARACT EXTRACTION     • COLONOSCOPY  2014    @[place], [facility] per pt   • COLONOSCOPY N/A 6/7/2018    incomplete exam/ poor prep   • COLONOSCOPY N/A 8/27/2018    NBIH, diverticulosis in sigmoid/descending colon   • ENDOSCOPY N/A 10/14/2016    normal exam/path   • ENDOSCOPY N/A 8/27/2018    normal exam/path   • ERCP  2010    @ [facility]   • HERNIA REPAIR Bilateral     inguinal and umbilical   • LAPAROSCOPIC CHOLECYSTECTOMY     • SPHINCTEROTOMY     • TONSILLECTOMY     • VASECTOMY         Scheduled Meds:  Outpatient Encounter Medications as of 11/12/2020   Medication Sig Dispense Refill   • aspirin 325 MG tablet Take 325 mg by mouth Daily.     • atenolol (TENORMIN) 100 MG tablet      • atorvastatin (LIPITOR) 40 MG tablet Take 40 mg by mouth Daily.     • Cholecalciferol (VITAMIN D PO) Take  by mouth.     • diphenoxylate-atropine (LOMOTIL) 2.5-0.025 MG per tablet TAKE 1 TABLET BY MOUTH AFTER EACH LOOSE STOOL . DO NOT EXCEED 8 PER 24 HOURS     • ferrous gluconate (FERGON) 240 (27 FE) MG tablet Take  by mouth Daily.     • lisinopril (PRINIVIL,ZESTRIL) 20 MG tablet Take 40 mg by mouth Daily.     • melatonin 5 MG tablet tablet Take 10 mg by mouth.     • omeprazole (PriLOSEC) 40 MG capsule      • oxybutynin (DITROPAN) 5 MG tablet Take 5 mg by mouth 3 (Three) Times a Day.     • vitamin B-12 (CYANOCOBALAMIN) 500 MCG tablet Take 500 mcg by mouth Daily.     • vitamin C (ASCORBIC ACID) 500 MG tablet Take 500 mg by mouth Daily.     • [DISCONTINUED] Fluzone High-Dose Quadrivalent 0.7 ML suspension prefilled syringe PHARMACIST ADMINISTERED IMMUNIZATION ADMINISTERED AT TIME OF DISPENSING     • cyclobenzaprine (FLEXERIL) 10 MG tablet Take 10 mg by mouth Daily As Needed.     • desoximetasone (TOPICORT) 0.25 % cream        No facility-administered encounter medications on file as of 11/12/2020.        Continuous Infusions:No current facility-administered medications for this visit.        PRN Meds:.    Allergies   Allergen Reactions   • Levaquin [Levofloxacin]        Social History     Socioeconomic History   • Marital status:      Spouse name: Not on file   • Number of children: Not on file   • Years of education: Not on file   • Highest education level: Not on file   Tobacco Use   • Smoking status: Former Smoker     Packs/day: 1.00     Types: Cigarettes     Quit date:      Years since quittin.8   • Smokeless tobacco: Never Used   Substance and Sexual Activity   • Alcohol use: Yes     Comment: social   • Drug use: No   • Sexual activity: Defer       Family History   Problem Relation Age of Onset   • Esophageal cancer Brother    • GIULIA disease Brother    • Prostate cancer Father    • GIULIA disease Father    • GIULIA disease Brother    • GIULIA disease Brother    • GIULIA disease Brother        Review of Systems   Constitutional: Negative for activity change, appetite change, fatigue, fever and unexpected weight change.   HENT: Negative for trouble swallowing.    Respiratory: Negative for apnea, cough, choking, chest tightness, shortness of breath and wheezing.    Cardiovascular: Negative for chest pain, palpitations and leg swelling.   Gastrointestinal: Positive for diarrhea. Negative for abdominal distention, abdominal pain, anal bleeding, blood in stool, constipation, nausea, rectal pain and vomiting.       There were no vitals filed for this visit.    Physical Exam  Constitutional:       Appearance: He is well-developed.   Cardiovascular:      Rate and Rhythm: Normal rate and regular rhythm.      Heart sounds: Normal heart sounds.   Pulmonary:      Effort: Pulmonary effort is normal. No respiratory distress.      Breath sounds: Normal breath sounds. No wheezing.   Abdominal:      General: Bowel sounds are normal. There is no distension.      Palpations: Abdomen is soft. There is no mass.      Tenderness: There is no abdominal tenderness. There is no guarding.      Hernia: No hernia is  present.   Skin:     General: Skin is warm and dry.      Capillary Refill: Capillary refill takes less than 2 seconds.   Neurological:      Mental Status: He is alert and oriented to person, place, and time.   Psychiatric:         Behavior: Behavior normal.       Problem list    Diarrhea  Fecal urgency  Gas and bloating    Assessment/plan    Memo presents today in follow-up with a primary complaint of diarrhea onset 3 months ago.  He is prone to diarrhea about once a year after he travels to [place] but this year his trip was postponed.  He had some stool testing that you eval but we do not have a copy the results not sure if this was just a simple culture or not.  He does complain of excessive gas and bloating.  Differential would include infectious diarrhea versus SIBO versus colitis.  Recommend stool testing to include GI PCR and C. difficile.  If negative consider treatment with Xifaxan versus sigmoidoscopy with biopsy to rule out microscopic colitis.  Obtain a copy of recent labs and stool testing done at [facility].  Further recommendations and follow-up pending the aforementioned work-up.

## 2020-11-12 NOTE — TELEPHONE ENCOUNTER
----- Message from IDA Krishna sent at 11/12/2020  9:24 AM EST -----  Needs a copy of labs and stool testing recently performed at [facility] with the patient's PCP.

## 2020-11-13 LAB — C DIFF TOX GENS STL QL NAA+PROBE: NEGATIVE

## 2020-11-16 LAB

## 2020-11-17 RX ORDER — AZITHROMYCIN 500 MG/1
500 TABLET, FILM COATED ORAL DAILY
Qty: 3 TABLET | Refills: 0 | Status: SHIPPED | OUTPATIENT
Start: 2020-11-17 | End: 2020-11-20

## 2020-11-17 NOTE — PROGRESS NOTES
Spoke to the patient over the phone regarding his stool test results and the need to start Zithromax.  All questions were answered.  Patient to call if his symptoms do not improve with antibiotic therapy.

## 2021-08-03 ENCOUNTER — HOSPITAL ENCOUNTER (OUTPATIENT)
Dept: CARDIOLOGY | Facility: HOSPITAL | Age: 81
Discharge: HOME | End: 2021-08-03

## 2021-08-03 ENCOUNTER — TRANSCRIBE ORDERS (OUTPATIENT)
Dept: LAB | Facility: HOSPITAL | Age: 81
End: 2021-08-03

## 2021-08-03 ENCOUNTER — LAB (OUTPATIENT)
Dept: LAB | Facility: HOSPITAL | Age: 81
End: 2021-08-03

## 2021-08-03 ENCOUNTER — TRANSCRIBE ORDERS (OUTPATIENT)
Dept: ADMINISTRATIVE | Facility: HOSPITAL | Age: 81
End: 2021-08-03

## 2021-08-03 DIAGNOSIS — Z01.818 PRE-OP TESTING: Primary | ICD-10-CM

## 2021-08-03 DIAGNOSIS — Z01.818 PRE-OP TESTING: ICD-10-CM

## 2021-08-03 DIAGNOSIS — T83.712A EROSION OF SUBURETHRAL SLING, INITIAL ENCOUNTER (HCC): ICD-10-CM

## 2021-08-03 DIAGNOSIS — T83.712A EROSION OF SUBURETHRAL SLING, INITIAL ENCOUNTER (HCC): Primary | ICD-10-CM

## 2021-08-03 LAB
ANION GAP SERPL CALCULATED.3IONS-SCNC: 5.1 MMOL/L (ref 5–15)
BASOPHILS # BLD AUTO: 0.03 10*3/MM3 (ref 0–0.2)
BASOPHILS NFR BLD AUTO: 0.4 % (ref 0–1.5)
BUN SERPL-MCNC: 16 MG/DL (ref 8–23)
BUN/CREAT SERPL: 24.6 (ref 7–25)
CALCIUM SPEC-SCNC: 9 MG/DL (ref 8.6–10.5)
CHLORIDE SERPL-SCNC: 102 MMOL/L (ref 98–107)
CO2 SERPL-SCNC: 28.9 MMOL/L (ref 22–29)
CREAT SERPL-MCNC: 0.65 MG/DL (ref 0.76–1.27)
DEPRECATED RDW RBC AUTO: 40.6 FL (ref 37–54)
EOSINOPHIL # BLD AUTO: 0.29 10*3/MM3 (ref 0–0.4)
EOSINOPHIL NFR BLD AUTO: 3.6 % (ref 0.3–6.2)
ERYTHROCYTE [DISTWIDTH] IN BLOOD BY AUTOMATED COUNT: 12.3 % (ref 12.3–15.4)
GFR SERPL CREATININE-BSD FRML MDRD: 118 ML/MIN/1.73
GLUCOSE SERPL-MCNC: 92 MG/DL (ref 65–99)
HCT VFR BLD AUTO: 39.2 % (ref 37.5–51)
HGB BLD-MCNC: 13 G/DL (ref 13–17.7)
IMM GRANULOCYTES # BLD AUTO: 0.03 10*3/MM3 (ref 0–0.05)
IMM GRANULOCYTES NFR BLD AUTO: 0.4 % (ref 0–0.5)
LYMPHOCYTES # BLD AUTO: 1.29 10*3/MM3 (ref 0.7–3.1)
LYMPHOCYTES NFR BLD AUTO: 15.9 % (ref 19.6–45.3)
MCH RBC QN AUTO: 30.4 PG (ref 26.6–33)
MCHC RBC AUTO-ENTMCNC: 33.2 G/DL (ref 31.5–35.7)
MCV RBC AUTO: 91.6 FL (ref 79–97)
MONOCYTES # BLD AUTO: 0.73 10*3/MM3 (ref 0.1–0.9)
MONOCYTES NFR BLD AUTO: 9 % (ref 5–12)
NEUTROPHILS NFR BLD AUTO: 5.73 10*3/MM3 (ref 1.7–7)
NEUTROPHILS NFR BLD AUTO: 70.7 % (ref 42.7–76)
NRBC BLD AUTO-RTO: 0 /100 WBC (ref 0–0.2)
PLATELET # BLD AUTO: 219 10*3/MM3 (ref 140–450)
PMV BLD AUTO: 10.9 FL (ref 6–12)
POTASSIUM SERPL-SCNC: 4.3 MMOL/L (ref 3.5–5.2)
RBC # BLD AUTO: 4.28 10*6/MM3 (ref 4.14–5.8)
SODIUM SERPL-SCNC: 136 MMOL/L (ref 136–145)
WBC # BLD AUTO: 8.1 10*3/MM3 (ref 3.4–10.8)

## 2021-08-03 PROCEDURE — 80048 BASIC METABOLIC PNL TOTAL CA: CPT

## 2021-08-03 PROCEDURE — 85025 COMPLETE CBC W/AUTO DIFF WBC: CPT

## 2021-08-03 PROCEDURE — 93010 ELECTROCARDIOGRAM REPORT: CPT | Performed by: INTERNAL MEDICINE

## 2021-08-03 PROCEDURE — 93005 ELECTROCARDIOGRAM TRACING: CPT | Performed by: UROLOGY

## 2021-08-03 PROCEDURE — 36415 COLL VENOUS BLD VENIPUNCTURE: CPT

## 2021-08-04 LAB — QT INTERVAL: 479 MS

## 2021-08-31 ENCOUNTER — HOSPITAL ENCOUNTER (OUTPATIENT)
Dept: GENERAL RADIOLOGY | Facility: HOSPITAL | Age: 81
Discharge: HOME | End: 2021-08-31

## 2021-08-31 ENCOUNTER — LAB (OUTPATIENT)
Dept: LAB | Facility: HOSPITAL | Age: 81
End: 2021-08-31

## 2021-08-31 LAB
ANION GAP SERPL CALCULATED.3IONS-SCNC: 6.5 MMOL/L (ref 5–15)
BUN SERPL-MCNC: 16 MG/DL (ref 8–23)
BUN/CREAT SERPL: 20.3 (ref 7–25)
CALCIUM SPEC-SCNC: 8.9 MG/DL (ref 8.6–10.5)
CHLORIDE SERPL-SCNC: 105 MMOL/L (ref 98–107)
CO2 SERPL-SCNC: 28.5 MMOL/L (ref 22–29)
CREAT SERPL-MCNC: 0.79 MG/DL (ref 0.76–1.27)
GFR SERPL CREATININE-BSD FRML MDRD: 94 ML/MIN/1.73
GLUCOSE SERPL-MCNC: 95 MG/DL (ref 65–99)
POTASSIUM SERPL-SCNC: 4 MMOL/L (ref 3.5–5.2)
SARS-COV-2 ORF1AB RESP QL NAA+PROBE: NOT DETECTED
SODIUM SERPL-SCNC: 140 MMOL/L (ref 136–145)

## 2021-08-31 PROCEDURE — C9803 HOPD COVID-19 SPEC COLLECT: HCPCS

## 2021-08-31 PROCEDURE — U0005 INFEC AGEN DETEC AMPLI PROBE: HCPCS

## 2021-08-31 PROCEDURE — 71046 X-RAY EXAM CHEST 2 VIEWS: CPT

## 2021-08-31 PROCEDURE — 80048 BASIC METABOLIC PNL TOTAL CA: CPT

## 2021-08-31 PROCEDURE — U0004 COV-19 TEST NON-CDC HGH THRU: HCPCS

## 2021-09-01 ENCOUNTER — ANESTHESIA EVENT (OUTPATIENT)
Dept: PERIOP | Facility: HOSPITAL | Age: 81
End: 2021-09-01

## 2021-09-02 ENCOUNTER — ANESTHESIA (OUTPATIENT)
Dept: PERIOP | Facility: HOSPITAL | Age: 81
End: 2021-09-02

## 2021-09-02 ENCOUNTER — HOSPITAL ENCOUNTER (OUTPATIENT)
Facility: HOSPITAL | Age: 81
Setting detail: HOSPITAL OUTPATIENT SURGERY
Discharge: HOME | End: 2021-09-02
Attending: UROLOGY | Admitting: UROLOGY

## 2021-09-02 VITALS
OXYGEN SATURATION: 100 % | HEART RATE: 59 BPM | SYSTOLIC BLOOD PRESSURE: 136 MMHG | TEMPERATURE: 96 F | BODY MASS INDEX: 33.6 KG/M2 | HEIGHT: 71 IN | DIASTOLIC BLOOD PRESSURE: 55 MMHG | WEIGHT: 240 LBS | RESPIRATION RATE: 16 BRPM

## 2021-09-02 DIAGNOSIS — N39.3 URINARY INCONTINENCE, STRESS, MALE: Primary | ICD-10-CM

## 2021-09-02 DIAGNOSIS — K56.609 SBO (SMALL BOWEL OBSTRUCTION) (HCC): ICD-10-CM

## 2021-09-02 PROCEDURE — 25010000002 PROPOFOL 10 MG/ML EMULSION

## 2021-09-02 PROCEDURE — C1771 REP DEV, URINARY, W/SLING: HCPCS | Performed by: UROLOGY

## 2021-09-02 PROCEDURE — 25010000002 FENTANYL CITRATE (PF) 100 MCG/2ML SOLUTION

## 2021-09-02 PROCEDURE — 25010000002 DEXAMETHASONE PER 1 MG

## 2021-09-02 DEVICE — MALE SLING SYSTEM
Type: IMPLANTABLE DEVICE | Site: BLADDER | Status: FUNCTIONAL
Brand: ADVANCE™ XP

## 2021-09-02 RX ORDER — ACETAMINOPHEN 650 MG/1
650 SUPPOSITORY RECTAL ONCE AS NEEDED
Status: DISCONTINUED | OUTPATIENT
Start: 2021-09-02 | End: 2021-09-02 | Stop reason: HOSPADM

## 2021-09-02 RX ORDER — PROPOFOL 10 MG/ML
VIAL (ML) INTRAVENOUS AS NEEDED
Status: DISCONTINUED | OUTPATIENT
Start: 2021-09-02 | End: 2021-09-02 | Stop reason: SURG

## 2021-09-02 RX ORDER — SODIUM CHLORIDE, SODIUM LACTATE, POTASSIUM CHLORIDE, CALCIUM CHLORIDE 600; 310; 30; 20 MG/100ML; MG/100ML; MG/100ML; MG/100ML
100 INJECTION, SOLUTION INTRAVENOUS CONTINUOUS
Status: DISCONTINUED | OUTPATIENT
Start: 2021-09-02 | End: 2021-09-02 | Stop reason: HOSPADM

## 2021-09-02 RX ORDER — SODIUM CHLORIDE, SODIUM LACTATE, POTASSIUM CHLORIDE, CALCIUM CHLORIDE 600; 310; 30; 20 MG/100ML; MG/100ML; MG/100ML; MG/100ML
9 INJECTION, SOLUTION INTRAVENOUS CONTINUOUS PRN
Status: DISCONTINUED | OUTPATIENT
Start: 2021-09-02 | End: 2021-09-02 | Stop reason: HOSPADM

## 2021-09-02 RX ORDER — LABETALOL HYDROCHLORIDE 5 MG/ML
5 INJECTION, SOLUTION INTRAVENOUS
Status: DISCONTINUED | OUTPATIENT
Start: 2021-09-02 | End: 2021-09-02 | Stop reason: HOSPADM

## 2021-09-02 RX ORDER — BUPIVACAINE HYDROCHLORIDE 2.5 MG/ML
INJECTION, SOLUTION EPIDURAL; INFILTRATION; INTRACAUDAL AS NEEDED
Status: DISCONTINUED | OUTPATIENT
Start: 2021-09-02 | End: 2021-09-02 | Stop reason: HOSPADM

## 2021-09-02 RX ORDER — DEXAMETHASONE SODIUM PHOSPHATE 4 MG/ML
INJECTION, SOLUTION INTRA-ARTICULAR; INTRALESIONAL; INTRAMUSCULAR; INTRAVENOUS; SOFT TISSUE AS NEEDED
Status: DISCONTINUED | OUTPATIENT
Start: 2021-09-02 | End: 2021-09-02 | Stop reason: SURG

## 2021-09-02 RX ORDER — ACETAMINOPHEN 325 MG/1
650 TABLET ORAL ONCE AS NEEDED
Status: DISCONTINUED | OUTPATIENT
Start: 2021-09-02 | End: 2021-09-02 | Stop reason: HOSPADM

## 2021-09-02 RX ORDER — IPRATROPIUM BROMIDE AND ALBUTEROL SULFATE 2.5; .5 MG/3ML; MG/3ML
3 SOLUTION RESPIRATORY (INHALATION) ONCE AS NEEDED
Status: DISCONTINUED | OUTPATIENT
Start: 2021-09-02 | End: 2021-09-02 | Stop reason: HOSPADM

## 2021-09-02 RX ORDER — SODIUM CHLORIDE, SODIUM LACTATE, POTASSIUM CHLORIDE, CALCIUM CHLORIDE 600; 310; 30; 20 MG/100ML; MG/100ML; MG/100ML; MG/100ML
INJECTION, SOLUTION INTRAVENOUS CONTINUOUS PRN
Status: DISCONTINUED | OUTPATIENT
Start: 2021-09-02 | End: 2021-09-02 | Stop reason: SURG

## 2021-09-02 RX ORDER — FENTANYL CITRATE 50 UG/ML
25 INJECTION, SOLUTION INTRAMUSCULAR; INTRAVENOUS
Status: DISCONTINUED | OUTPATIENT
Start: 2021-09-02 | End: 2021-09-02 | Stop reason: HOSPADM

## 2021-09-02 RX ORDER — DIPHENHYDRAMINE HYDROCHLORIDE 50 MG/ML
12.5 INJECTION INTRAMUSCULAR; INTRAVENOUS
Status: DISCONTINUED | OUTPATIENT
Start: 2021-09-02 | End: 2021-09-02 | Stop reason: HOSPADM

## 2021-09-02 RX ORDER — HYDRALAZINE HYDROCHLORIDE 20 MG/ML
5 INJECTION INTRAMUSCULAR; INTRAVENOUS
Status: DISCONTINUED | OUTPATIENT
Start: 2021-09-02 | End: 2021-09-02 | Stop reason: HOSPADM

## 2021-09-02 RX ORDER — FENTANYL CITRATE 50 UG/ML
50 INJECTION, SOLUTION INTRAMUSCULAR; INTRAVENOUS
Status: DISCONTINUED | OUTPATIENT
Start: 2021-09-02 | End: 2021-09-02 | Stop reason: HOSPADM

## 2021-09-02 RX ORDER — FENTANYL CITRATE 50 UG/ML
INJECTION, SOLUTION INTRAMUSCULAR; INTRAVENOUS AS NEEDED
Status: DISCONTINUED | OUTPATIENT
Start: 2021-09-02 | End: 2021-09-02 | Stop reason: SURG

## 2021-09-02 RX ORDER — SODIUM CHLORIDE 0.9 % (FLUSH) 0.9 %
10 SYRINGE (ML) INJECTION EVERY 12 HOURS SCHEDULED
Status: DISCONTINUED | OUTPATIENT
Start: 2021-09-02 | End: 2021-09-02 | Stop reason: HOSPADM

## 2021-09-02 RX ORDER — ONDANSETRON 2 MG/ML
4 INJECTION INTRAMUSCULAR; INTRAVENOUS ONCE AS NEEDED
Status: DISCONTINUED | OUTPATIENT
Start: 2021-09-02 | End: 2021-09-02 | Stop reason: HOSPADM

## 2021-09-02 RX ORDER — DEXAMETHASONE SODIUM PHOSPHATE 4 MG/ML
8 INJECTION, SOLUTION INTRA-ARTICULAR; INTRALESIONAL; INTRAMUSCULAR; INTRAVENOUS; SOFT TISSUE ONCE AS NEEDED
Status: DISCONTINUED | OUTPATIENT
Start: 2021-09-02 | End: 2021-09-02 | Stop reason: HOSPADM

## 2021-09-02 RX ORDER — SODIUM CHLORIDE 0.9 % (FLUSH) 0.9 %
10 SYRINGE (ML) INJECTION AS NEEDED
Status: DISCONTINUED | OUTPATIENT
Start: 2021-09-02 | End: 2021-09-02 | Stop reason: HOSPADM

## 2021-09-02 RX ORDER — NALOXONE HCL 0.4 MG/ML
0.4 VIAL (ML) INJECTION AS NEEDED
Status: DISCONTINUED | OUTPATIENT
Start: 2021-09-02 | End: 2021-09-02 | Stop reason: HOSPADM

## 2021-09-02 RX ORDER — LIDOCAINE HYDROCHLORIDE 20 MG/ML
INJECTION, SOLUTION EPIDURAL; INFILTRATION; INTRACAUDAL; PERINEURAL AS NEEDED
Status: DISCONTINUED | OUTPATIENT
Start: 2021-09-02 | End: 2021-09-02 | Stop reason: SURG

## 2021-09-02 RX ORDER — EPHEDRINE SULFATE 50 MG/ML
5 INJECTION, SOLUTION INTRAVENOUS ONCE AS NEEDED
Status: DISCONTINUED | OUTPATIENT
Start: 2021-09-02 | End: 2021-09-02 | Stop reason: HOSPADM

## 2021-09-02 RX ADMIN — FENTANYL CITRATE 50 MCG: 50 INJECTION, SOLUTION INTRAMUSCULAR; INTRAVENOUS at 07:29

## 2021-09-02 RX ADMIN — PROPOFOL 200 MCG/KG/MIN: 10 INJECTION, EMULSION INTRAVENOUS at 07:41

## 2021-09-02 RX ADMIN — CEFAZOLIN SODIUM 2 G: 1 INJECTION, POWDER, FOR SOLUTION INTRAMUSCULAR; INTRAVENOUS at 07:16

## 2021-09-02 RX ADMIN — LIDOCAINE HYDROCHLORIDE 100 MG: 20 INJECTION, SOLUTION EPIDURAL; INFILTRATION; INTRACAUDAL; PERINEURAL at 07:12

## 2021-09-02 RX ADMIN — PROPOFOL 200 MCG/KG/MIN: 10 INJECTION, EMULSION INTRAVENOUS at 07:14

## 2021-09-02 RX ADMIN — FENTANYL CITRATE 50 MCG: 50 INJECTION, SOLUTION INTRAMUSCULAR; INTRAVENOUS at 07:13

## 2021-09-02 RX ADMIN — SODIUM CHLORIDE, POTASSIUM CHLORIDE, SODIUM LACTATE AND CALCIUM CHLORIDE 9 ML/HR: 600; 310; 30; 20 INJECTION, SOLUTION INTRAVENOUS at 06:41

## 2021-09-02 RX ADMIN — DEXAMETHASONE SODIUM PHOSPHATE 4 MG: 4 INJECTION, SOLUTION INTRAMUSCULAR; INTRAVENOUS at 07:16

## 2021-09-02 RX ADMIN — SODIUM CHLORIDE, SODIUM LACTATE, POTASSIUM CHLORIDE, AND CALCIUM CHLORIDE: .6; .31; .03; .02 INJECTION, SOLUTION INTRAVENOUS at 07:07

## 2021-09-02 RX ADMIN — PROPOFOL 200 MG: 10 INJECTION, EMULSION INTRAVENOUS at 07:12

## 2021-09-02 NOTE — ANESTHESIA PREPROCEDURE EVALUATION
Anesthesia Evaluation     Patient summary reviewed and Nursing notes reviewed   history of anesthetic complications (MH reported in pt's nephew. no personal history.):  NPO Solid Status: > 8 hours  NPO Liquid Status: > 8 hours           Airway   Mallampati: II  TM distance: <3 FB  Neck ROM: full  No difficulty expected and no difficulty expected  Dental - normal exam   (+) poor dentition and implants    Pulmonary - normal exam   (+) a smoker Former, sleep apnea,   Cardiovascular - normal exam    (+) hypertension, CAD, hyperlipidemia,       Neuro/Psych- negative ROS  GI/Hepatic/Renal/Endo    (+)  GERD,      Musculoskeletal (-) negative ROS    Abdominal  - normal exam    Bowel sounds: normal.   Substance History - negative use     OB/GYN negative ob/gyn ROS         Other   blood dyscrasia anemia,   history of cancer                    Anesthesia Plan    ASA 3     general     intravenous induction     Anesthetic plan, all risks, benefits, and alternatives have been provided, discussed and informed consent has been obtained with: patient.    Plan discussed with CAA.

## 2021-09-02 NOTE — ANESTHESIA POSTPROCEDURE EVALUATION
Patient: Memo Bishop    Procedure Summary     Date: 09/02/21 Room / Location: [facility] OR 06 / [facility] MAIN OR    Anesthesia Start: 0706 Anesthesia Stop: 0830    Procedure: BLADDER SUSPENSION MALE SLING and cystoscopy (N/A ) Diagnosis:       Overactive bladder      Hypertonicity of bladder      Urinary incontinence, stress, male      (Overactive bladder [N32.81])      (Hypertonicity of bladder [N31.8])      (Urinary incontinence, stress, male [N39.3])    Surgeons: Memo Leung MD Provider: Shaan Brasher MD    Anesthesia Type: general ASA Status: 3          Anesthesia Type: general    Vitals  Vitals Value Taken Time   /63 09/02/21 0909   Temp 97.8 °F (36.6 °C) 09/02/21 0909   Pulse 64 09/02/21 0911   Resp 21 09/02/21 0909   SpO2 100 % 09/02/21 0911   Vitals shown include unvalidated device data.        Post Anesthesia Care and Evaluation    Patient location during evaluation: PACU  Patient participation: complete - patient participated  Level of consciousness: awake  Pain scale: See nurse's notes for pain score.  Pain management: adequate  Airway patency: patent  Anesthetic complications: No anesthetic complications  PONV Status: none  Cardiovascular status: acceptable  Respiratory status: acceptable  Hydration status: acceptable    Comments: Patient seen and examined postoperatively; vital signs stable; SpO2 greater than or equal to 90%; cardiopulmonary status stable; nausea/vomiting adequately controlled; pain adequately controlled; no apparent anesthesia complications; patient discharged from anesthesia care when discharge criteria were met

## 2021-09-02 NOTE — H&P
Urology History and Physical    Patient:Memo Bishop  :1940   Room:[facility] MAIN OR/MAIN OR   Admit Date2021  Age:81 y.o.      SEX:male      DOS:2021      MR:[id number]      Visit:[id number]       Chief complaint urinary leakage    Subjective     History of present illness: Patient is a 81-year-old white male status post radical retropubic prostatectomy who is here for a advance male sling for his severe urinary incontinence    Review of Systems  12 point review of systems were reviewed and are negative except for what is in HPI.    History  Past Medical History:   Diagnosis Date   • Abnormal LFTs     hx   • Anemia    • B12 deficiency    • Cancer (CMS/HCC) 2007    prostate cancer   • Cardiac arrest (CMS/[facility])     with heart cath   • Coronary artery disease    • Delayed emergence from anesthesia     runs in family   • GERD (gastroesophageal reflux disease)    • Hyperlipidemia    • Hypertension    • Skin disorder     elbows   • Sleep apnea     cpap states will not bring with    • Urinary incontinence    • Urinary urgency    • Vitamin D deficiency      Past Surgical History:   Procedure Laterality Date   • CARDIAC CATHETERIZATION      x2     • CATARACT EXTRACTION Bilateral    • COLONOSCOPY      @[facility] per pt   • COLONOSCOPY N/A 2018    incomplete exam/ poor prep   • COLONOSCOPY N/A 2018    N[facility], diverticulosis in sigmoid/descending colon   • ENDOSCOPY N/A 10/14/2016    normal exam/path   • ENDOSCOPY N/A 2018    normal exam/path   • ERCP      @ [facility]   • EYE SURGERY      laser to clear of lens   • HERNIA REPAIR Bilateral     inguinal and umbilical   • LAPAROSCOPIC CHOLECYSTECTOMY     • SPHINCTEROTOMY     • TONSILLECTOMY     • VASECTOMY       Social History     Socioeconomic History   • Marital status:      Spouse name: Not on file   • Number of children: Not on file   • Years of education: Not on file   • Highest education level: Not on file   Tobacco Use   •  Smoking status: Former Smoker     Packs/day: 1.00     Types: Cigarettes     Quit date:      Years since quittin.7   • Smokeless tobacco: Never Used   Substance and Sexual Activity   • Alcohol use: Yes     Comment: social   • Drug use: No   • Sexual activity: Defer     Family History   Problem Relation Age of Onset   • Esophageal cancer Brother    • GIULIA disease Brother    • Prostate cancer Father    • GIULIA disease Father    • GIULIA disease Brother    • GIULIA disease Brother    • GIULIA disease Brother      Allergies   Allergen Reactions   • Levaquin [Levofloxacin] Itching     Mild   Tolerates cipro     Prior to Admission medications    Medication Sig Start Date End Date Taking? Authorizing Provider   aspirin 325 MG tablet Take 325 mg by mouth Daily.   Yes Tejas Mcbride MD   atenolol (TENORMIN) 100 MG tablet Take 100 mg by mouth Daily. Take preop 16  Yes Tejas Mcbride MD   atorvastatin (LIPITOR) 40 MG tablet Take 40 mg by mouth Daily. May take preop   Yes Tejas Mcbride MD   CHLORZOXAZONE PO Take 1 tablet by mouth As Needed. With paracetamol   Muscle relaxer   dont take preop   Yes Tejas Mcbride MD   Cholecalciferol (VITAMIN D PO) Take 50 mcg by mouth Daily.   Yes Tejas Mcbride MD   desoximetasone (TOPICORT) 0.25 % cream Apply 1 application topically to the appropriate area as directed As Needed. 16  Yes Tejas Mcbride MD   ferrous gluconate (FERGON) 240 (27 FE) MG tablet Take 240 mg by mouth 2 (Two) Times a Day With Meals.   Yes Tejas Mcbride MD   lisinopril (PRINIVIL,ZESTRIL) 20 MG tablet Take 40 mg by mouth Daily. Last 9 am 18  Yes Tejas Mcbride MD   melatonin 5 MG tablet tablet Take 10 mg by mouth At Night As Needed.   Yes Tejas Mcbride MD   omeprazole (PriLOSEC) 40 MG capsule Take 40 mg by mouth Daily. May take preop 16  Yes Tejas Mcbride MD   oxybutynin (DITROPAN) 5 MG tablet Take 5 mg by mouth 3 (Three) Times a  Day. Plans to not take 8/30/16  Yes Tejas Mcbride MD   vitamin B-12 (CYANOCOBALAMIN) 500 MCG tablet Take 500 mcg by mouth Daily.   Yes Tejas Mcbride MD   vitamin C (ASCORBIC ACID) 500 MG tablet Take 500 mg by mouth 2 (two) times a day.   Yes Tejas Mcbride MD   cyclobenzaprine (FLEXERIL) 10 MG tablet Take 10 mg by mouth Daily As Needed.    Tejas Mcbride MD   diphenoxylate-atropine (LOMOTIL) 2.5-0.025 MG per tablet Take 1 tablet by mouth As Needed. 10/2/20   Tejas Mcbride MD         Objective     tMax 24 hours:  No data recorded.    Vital Sign Ranges:     Intake and Output Last 3 Shifts:  No intake/output data recorded.    Physical Exam:     General Appearance:    Alert, cooperative, in no acute distress   Head:    Normocephalic, without obvious abnormality, atraumatic   Eyes:            Lids and lashes normal, conjunctivae and sclerae normal, no   icterus, no pallor, corneas clear, PERRLA   Ears:    Ears appear intact with no abnormalities noted   Throat:   No oral lesions, no thrush, oral mucosa moist   Neck:   No adenopathy, supple, trachea midline, no thyromegaly, no   carotid bruit, no JVD   Back:     No kyphosis present, no scoliosis present, no skin lesions,      erythema or scars, no tenderness to percussion or                   palpation,   range of motion normal   Lungs:     Clear to auscultation,respirations regular, even and                  unlabored    Heart:    Regular rhythm and normal rate, normal S1 and S2, no            murmur, no gallop, no rub, no click   Chest Wall:    No abnormalities observed   Abdomen:     Normal bowel sounds, no masses, no organomegaly, soft        non-tender, non-distended, no guarding, no rebound                tenderness   Rectal:     Deferred   Extremities:   Moves all extremities well, no edema, no cyanosis, no             redness   Pulses:   Pulses palpable and equal bilaterally   Skin:   No bleeding, bruising or rash   Lymph  nodes:   No palpable adenopathy   Neurologic:   Cranial nerves 2 - 12 grossly intact, sensation intact, DTR       present and equal bilaterally       Results Review:     Lab Results (last 24 hours)     ** No results found for the last 24 hours. **         No results found for: URINECX     Imaging Results (Last 7 Days)     ** No results found for the last 168 hours. **          Inpatient Meds:   Scheduled Meds:ceFAZolin (ANCEF) in SWFI 2 g/20ml IV PUSH syringe, 2 g, Intravenous, Once  sodium chloride, 10 mL, Intravenous, Q12H       Continuous Infusions:lactated ringers, 9 mL/hr, Last Rate: 9 mL/hr (09/02/21 0641)       PRN Meds:.lactated ringers  •  sodium chloride      Assessment/Plan     Intrinsic sphincter deficiency    Prostate cancer status post radical prostatectomy    Plan advance male sling and cystoscopy.  Discussed with patient all risk benefits and options and he is willing to proceed.  He realizes he may need an artificial sphincter down the road since his urinary incontinence is quite severe    I discussed the patient's findings and my recommendations with patient.    Memo Leung MD  09/02/21  07:01 EDT

## 2021-09-02 NOTE — ANESTHESIA PROCEDURE NOTES
Airway  Date/Time: 9/2/2021 7:13 AM  End Time:9/2/2021 7:14 AM  Airway not difficult    General Information and Staff    Anesthesiologist: Shaan Brasher MD  CRNA: David Greenwood AA    Indications and Patient Condition  Indications for airway management: airway protection    Preoxygenated: yes  MILS not maintained throughout  Mask difficulty assessment: 0 - not attempted    Final Airway Details  Final airway type: supraglottic airway      Successful airway: LMA  Size 4    Number of attempts at approach: 1  Assessment: lips, teeth, and gum same as pre-op and atraumatic intubation    Additional Comments  LMA placed by David KITCHEN under supervision of Dr. Brasher.

## 2021-09-02 NOTE — OP NOTE
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    Anesthesia: General    Estimated Blood Loss: minimal    Implants:    Implant Name Type Inv. Item Serial No.  Lot No. LRB No. Used Action   SYS SLNG URETH M ADVANCE XP IE - [id number] Implant SYS SLNG URETH M ADVANCE XP IE  [employer] [id number] N/A 1 Implanted       Specimen:          None      Findings: Leaking    Complications: None    Description of Procedure: Patient was taken the operating room laid in the supine position where general endotracheal anesthesia was induced.  The knee is placed in a comfortable dorsolithotomy position where his groin area was prepped and draped usual sterile fashion.  Cystoscopy was carried out finding a normal urethra and a wide open bladder neck with an absent prostate and normal bladder.  The scope was removed and a Nieto catheter was placed.  Then we made a perineal incision in the midline through the subcutaneous tissues was there were a lot of down to the level of the bulbocavernosus muscles which was split in the midline.  The [place] retraction was used for retraction.  We placed the advance male sling with who the obturator space passing and obturator needle  on each side from outside the inside.  We place a sling of the proper amount of tension.  Note that we freed up the urethra at the level of the central tendon which was marked with a suture.  We freed this area up about 2 cm towards the bladder.  We tacked the sling down to the appropriate position on the urethra and and placed appropriate tension and and confirmed this with cystoscopic evaluation finding closure that was appropriate in the urethra.  The excess sling arms were removed and glue was used to close the 2 poke holes in the groin area.  We then closed the bulbocavernosus muscle with a 3-0 chromic suture.  We used the 2-0 Vicryl suture to close the subcutaneous tissue and 3 layers.  We closed the skin with a 4-0 Monocryl.  A Nieto catheter had already been reinserted and the patient will go home with a Nieto catheter and follow-up next week for a voiding trial.  Patient tolerated procedure well      Memo Leung MD     Date: 9/2/2021  Time: 08:16 EDT

## (undated) DEVICE — Device: Brand: DEFENDO AIR/WATER/SUCTION AND BIOPSY VALVE

## (undated) DEVICE — SLV SCD CALF HEMOFORCE DVT THERP REPROC MD

## (undated) DEVICE — TBG IRRI TUR Y/TYP NONVENT 98IN LF

## (undated) DEVICE — SUT GUT CHRM 4/0 RB1 27IN U203H

## (undated) DEVICE — GLV SURG BIOGEL LTX PF 7

## (undated) DEVICE — CANN NASL CO2 TRULINK W/O2 A/

## (undated) DEVICE — PENCL HND ROCKRSWTCH HOLSTR EZ CLEAN TP CRD 10FT

## (undated) DEVICE — TP SXN YANKR BULB STRL

## (undated) DEVICE — SOL IRRIG NACL 1000ML

## (undated) DEVICE — COUNT NDL FOAM STRIP W/MAG 60CT

## (undated) DEVICE — SOL IRRG H2O BG 3000ML STRL

## (undated) DEVICE — PK GYN LAPAROSCOPY 50

## (undated) DEVICE — TUBING, SUCTION, 1/4" X 10', STRAIGHT: Brand: MEDLINE

## (undated) DEVICE — SUT VIC 3/0 SH 27IN J416H

## (undated) DEVICE — KT SURG TURNOVER 050

## (undated) DEVICE — PRT BIOP SEALS

## (undated) DEVICE — FRCP BX RADJAW4 NDL 2.8 240CM LG OG BX40

## (undated) DEVICE — SPONGE: SPECIALTY CHERRY DISS XR 100/CS: Brand: MEDICAL ACTION INDUSTRIES

## (undated) DEVICE — DECANTER: Brand: UNBRANDED

## (undated) DEVICE — TOTAL TRAY, DB, 100% SILI FOLEY, 16FR 10: Brand: MEDLINE

## (undated) DEVICE — BAG,DRAINAGE,4L,A/R TOWER,LL,SLIDE TAP: Brand: MEDLINE

## (undated) DEVICE — TUBING, SUCTION, 1/4" X 12', STRAIGHT: Brand: MEDLINE

## (undated) DEVICE — THE TORRENT IRRIGATION SCOPE CONNECTOR IS USED WITH THE TORRENT IRRIGATION TUBING TO PROVIDE IRRIGATION FLUIDS SUCH AS STERILE WATER DURING GASTROINTESTINAL ENDOSCOPIC PROCEDURES WHEN USED IN CONJUNCTION WITH AN IRRIGATION PUMP (OR ELECTROSURGICAL UNIT).: Brand: TORRENT

## (undated) DEVICE — ADHS SKIN PREMIERPRO EXOFIN TOPICAL HI/VISC .5ML

## (undated) DEVICE — PANTY KNIT MATERN L/XL

## (undated) DEVICE — SUT VIC 2/0 CT2 27IN J269H

## (undated) DEVICE — NDL SPINE 22G 31/2IN BLK

## (undated) DEVICE — SUT SILK 2/0 FS BLK 18IN 685G

## (undated) DEVICE — BANDAGE,GAUZE,CONFORMING,1"X75",STRL,LF: Brand: MEDLINE

## (undated) DEVICE — PK MINOR LITHOTOMY 50

## (undated) DEVICE — BITEBLOCK OMNI BLOC

## (undated) DEVICE — SUT MONOCRYL 4/0 PS2 27IN Y426H ETY426H

## (undated) DEVICE — INTENDED FOR TISSUE SEPARATION, AND OTHER PROCEDURES THAT REQUIRE A SHARP SURGICAL BLADE TO PUNCTURE OR CUT.: Brand: BARD-PARKER ® CARBON RIB-BACK BLADES